# Patient Record
Sex: FEMALE | Race: WHITE | NOT HISPANIC OR LATINO | Employment: FULL TIME | URBAN - METROPOLITAN AREA
[De-identification: names, ages, dates, MRNs, and addresses within clinical notes are randomized per-mention and may not be internally consistent; named-entity substitution may affect disease eponyms.]

---

## 2017-01-16 ENCOUNTER — TRANSFERRED RECORDS (OUTPATIENT)
Dept: HEALTH INFORMATION MANAGEMENT | Facility: CLINIC | Age: 57
End: 2017-01-16

## 2017-01-18 ENCOUNTER — TRANSFERRED RECORDS (OUTPATIENT)
Dept: HEALTH INFORMATION MANAGEMENT | Facility: CLINIC | Age: 57
End: 2017-01-18

## 2017-02-15 ENCOUNTER — TRANSFERRED RECORDS (OUTPATIENT)
Dept: HEALTH INFORMATION MANAGEMENT | Facility: CLINIC | Age: 57
End: 2017-02-15

## 2017-03-13 ENCOUNTER — TRANSFERRED RECORDS (OUTPATIENT)
Dept: HEALTH INFORMATION MANAGEMENT | Facility: CLINIC | Age: 57
End: 2017-03-13

## 2017-03-23 ENCOUNTER — TRANSFERRED RECORDS (OUTPATIENT)
Dept: HEALTH INFORMATION MANAGEMENT | Facility: CLINIC | Age: 57
End: 2017-03-23

## 2017-03-29 ENCOUNTER — TRANSFERRED RECORDS (OUTPATIENT)
Dept: HEALTH INFORMATION MANAGEMENT | Facility: CLINIC | Age: 57
End: 2017-03-29

## 2017-05-10 ENCOUNTER — TRANSFERRED RECORDS (OUTPATIENT)
Dept: HEALTH INFORMATION MANAGEMENT | Facility: CLINIC | Age: 57
End: 2017-05-10

## 2017-06-28 ENCOUNTER — TRANSFERRED RECORDS (OUTPATIENT)
Dept: HEALTH INFORMATION MANAGEMENT | Facility: CLINIC | Age: 57
End: 2017-06-28

## 2017-07-27 ENCOUNTER — TRANSFERRED RECORDS (OUTPATIENT)
Dept: HEALTH INFORMATION MANAGEMENT | Facility: CLINIC | Age: 57
End: 2017-07-27

## 2017-09-28 ENCOUNTER — TRANSFERRED RECORDS (OUTPATIENT)
Dept: HEALTH INFORMATION MANAGEMENT | Facility: CLINIC | Age: 57
End: 2017-09-28

## 2017-11-01 ENCOUNTER — TRANSFERRED RECORDS (OUTPATIENT)
Dept: HEALTH INFORMATION MANAGEMENT | Facility: CLINIC | Age: 57
End: 2017-11-01

## 2017-11-02 NOTE — PROGRESS NOTES
SUBJECTIVE:   CC: Natty Roa is an 57 year old woman who presents for preventive health visit.     Healthy Habits:    Do you get at least three servings of calcium containing foods daily (dairy, green leafy vegetables, etc.)? yes    Amount of exercise or daily activities, outside of work: 0 day(s) per week    Problems taking medications regularly No    Medication side effects: No    Have you had an eye exam in the past two years? Yes, and has an appt scheduled in 2 weeks    Do you see a dentist twice per year? no    Do you have sleep apnea, excessive snoring or daytime drowsiness?yes- daytime drowsiness,  says she snores    The 10-year ASCVD risk score (Gabrielazulma BRAR Jr, et al., 2013) is: 1.5%    Values used to calculate the score:      Age: 57 years      Sex: Female      Is Non- : No      Diabetic: No      Tobacco smoker: No      Systolic Blood Pressure: 124 mmHg      Is BP treated: No      HDL Cholesterol: 68 mg/dL      Total Cholesterol: 198 mg/dL      Had mammogram done on Wednesday. Records are being sent    Abnormal Mood Symptoms  Onset: a few years    Description:   Depression: YES  Anxiety: YES    Accompanying Signs & Symptoms:  Still participating in activities that you used to enjoy: YES  Fatigue: YES  Irritability: YES  Difficulty concentrating: sometimes  Changes in appetite: no   Problems with sleep: YES- since sacha-menopause  Heart racing/beating fast : YES  Thoughts of hurting yourself or others: none    History:   Recent stress: YES  Prior depression hospitalization: None  Family history of depression: maybe  Family history of anxiety: no     Precipitating factors:   Alcohol/drug use: no    Alleviating factors:  counseling    Therapies Tried and outcome: None    Had a really rough year last year, see Dr. Shaw's last note.   Started seeing a counselor and this is going well. They are doing individual and couples therapy  Feels benavides, more anger and anxiety  Has never  been on depression medication medications before        Today's PHQ-2 Score:   PHQ-2 ( 1999 Pf[employer]) 11/30/2016 10/14/2016   Q1: Little interest or pleasure in doing things 0 2   Q2: Feeling down, depressed or hopeless 0 -   PHQ-2 Score 0 -   Q1: Little interest or pleasure in doing things - -   Q2: Feeling down, depressed or hopeless - -   PHQ-2 Score - -         Abuse: Current or Past(Physical, Sexual or Emotional)- No  Do you feel safe in your environment - Yes  Social History   Substance Use Topics     Smoking status: Former Smoker     Packs/day: 0.20     Years: 3.00     Types: Cigarettes     Quit date: 4/28/1990     Smokeless tobacco: Never Used     Alcohol use Yes      Comment: 1-2/mo     The patient does not drink >3 drinks per day nor >7 drinks per week.    Reviewed orders with patient.  Reviewed health maintenance and updated orders accordingly - Yes  Labs reviewed in EPIC  BP Readings from Last 3 Encounters:   11/03/17 124/78   11/30/16 126/80   10/14/16 132/86    Wt Readings from Last 3 Encounters:   11/03/17 158 lb 11.2 oz (72 kg)   11/30/16 154 lb 11.2 oz (70.2 kg)   10/14/16 152 lb 11.2 oz (69.3 kg)                      Patient over age 50, mutual decision to screen reflected in health maintenance.      Pertinent mammograms are reviewed under the imaging tab.  History of abnormal Pap smear: Status post benign hysterectomy. Health Maintenance and Surgical History updated.    Reviewed and updated as needed this visit by clinical staff  Tobacco  Allergies  Meds         Reviewed and updated as needed this visit by Provider            ROS:  C: NEGATIVE for fever, chills, change in weight  I: NEGATIVE for worrisome rashes, moles or lesions  E: NEGATIVE for vision changes or irritation  ENT: NEGATIVE for ear, mouth and throat problems  R: NEGATIVE for significant cough or SOB  B: NEGATIVE for masses, tenderness or discharge  CV: NEGATIVE for chest pain, palpitations or peripheral edema  GI: NEGATIVE for  nausea, abdominal pain, heartburn, or change in bowel habits  : NEGATIVE for unusual urinary or vaginal symptoms. No vaginal bleeding.  M: NEGATIVE for significant arthralgias or myalgia  N: NEGATIVE for weakness, dizziness or paresthesias  E: NEGATIVE for temperature intolerance, skin/hair changes  H: NEGATIVE for bleeding problems  PSYCHIATRIC: NEGATIVE foralcohol abuse, delusions, hallucinations, drug usage, thoughts of hurting someone else and thoughts of self harm     OBJECTIVE:   /78  Pulse 77  Temp 97.3  F (36.3  C) (Oral)  Wt 158 lb 11.2 oz (72 kg)  LMP 04/08/2011  SpO2 98%  BMI 26.41 kg/m2  EXAM:  GENERAL: healthy, alert and no distress  EYES: Eyes grossly normal to inspection, PERRL and conjunctivae and sclerae normal  HENT: ear canals and TM's normal, nose and mouth without ulcers or lesions  NECK: no adenopathy, no asymmetry, masses, or scars and thyroid normal to palpation  RESP: lungs clear to auscultation - no rales, rhonchi or wheezes  BREAST: normal without masses, tenderness or nipple discharge and no palpable axillary masses or adenopathy  CV: regular rate and rhythm, normal S1 S2, no S3 or S4, no murmur, click or rub, no peripheral edema and peripheral pulses strong  ABDOMEN: soft, nontender, no hepatosplenomegaly, no masses and bowel sounds normal  MS: no gross musculoskeletal defects noted, no edema  SKIN: no suspicious lesions or rashes  NEURO: Normal strength and tone, mentation intact and speech normal  PSYCH: mentation appears normal, affect normal/bright    ASSESSMENT/PLAN:       ICD-10-CM    1. Encounter for routine adult medical exam with abnormal findings Z00.01    2. LATHA (generalized anxiety disorder) F41.1 sertraline (ZOLOFT) 50 MG tablet     OFFICE/OUTPT VISIT,EST,LEVL III   3. Major depressive disorder, recurrent episode, moderate (H) F33.1 sertraline (ZOLOFT) 50 MG tablet     OFFICE/OUTPT VISIT,EST,LEVL III   4. Vitamin D deficiency E55.9 Vitamin D Deficiency   5.  "Screen for colon cancer Z12.11 COLORECTAL SURGERY REFERRAL   6. Visit for screening mammogram Z12.31 MA SCREENING DIGITAL BILAT - Future  (s+30)   7. Need for hepatitis C screening test Z11.59 Hepatitis C Screen Reflex to HCV RNA Quant and Genotype   8. Abnormal laboratory test R89.9 GGT     Comprehensive metabolic panel (BMP + Alb, Alk Phos, ALT, AST, Total. Bili, TP)   9. History of anemia Z86.2 CBC with platelets     Start zoloft as directed. See patient instructions. Return to clinic for any new or worsening symptoms or go to ER Urgent care in off hours    > 20 minutes were spent with the patient and / or family present in education and / or counseling regarding the above issues in addition to the preventative physical.  This represented more than 50% of the time spent interacting with the patient during this visit.     COUNSELING:   Reviewed preventive health counseling, as reflected in patient instructions         reports that she quit smoking about 27 years ago. Her smoking use included Cigarettes. She has a 0.60 pack-year smoking history. She has never used smokeless tobacco.    Estimated body mass index is 26.41 kg/(m^2) as calculated from the following:    Height as of 11/30/16: 5' 5\" (1.651 m).    Weight as of this encounter: 158 lb 11.2 oz (72 kg).       Counseling Resources:  ATP IV Guidelines  Pooled Cohorts Equation Calculator  Breast Cancer Risk Calculator  FRAX Risk Assessment  ICSI Preventive Guidelines  Dietary Guidelines for Americans, 2010  USDA's MyPlate  ASA Prophylaxis  Lung CA Screening    Clara Welch PA-C  [facility]  "

## 2017-11-02 NOTE — PATIENT INSTRUCTIONS
Start zoloft 1/2 tab daily for 1 week, then increase to 1 tab daily  Follow up in about 6 weeks  Schedule colonoscopy    Preventive Health Recommendations  Female Ages 50 - 64    Yearly exam: See your health care provider every year in order to  o Review health changes.   o Discuss preventive care.    o Review your medicines if your doctor has prescribed any.      Get a Pap test every three years (unless you have an abnormal result and your provider advises testing more often).    If you get Pap tests with HPV test, you only need to test every 5 years, unless you have an abnormal result.     You do not need a Pap test if your uterus was removed (hysterectomy) and you have not had cancer.    You should be tested each year for STDs (sexually transmitted diseases) if you're at risk.     Have a mammogram every 1 to 2 years.    Have a colonoscopy at age 50, or have a yearly FIT test (stool test). These exams screen for colon cancer.      Have a cholesterol test every 5 years, or more often if advised.    Have a diabetes test (fasting glucose) every three years. If you are at risk for diabetes, you should have this test more often.     If you are at risk for osteoporosis (brittle bone disease), think about having a bone density scan (DEXA).    Shots: Get a flu shot each year. Get a tetanus shot every 10 years.    Nutrition:     Eat at least 5 servings of fruits and vegetables each day.    Eat whole-grain bread, whole-wheat pasta and brown rice instead of white grains and rice.    Talk to your provider about Calcium and Vitamin D.     Lifestyle    Exercise at least 150 minutes a week (30 minutes a day, 5 days a week). This will help you control your weight and prevent disease.    Limit alcohol to one drink per day.    No smoking.     Wear sunscreen to prevent skin cancer.     See your dentist every six months for an exam and cleaning.    See your eye doctor every 1 to 2 years.

## 2017-11-03 ENCOUNTER — OFFICE VISIT (OUTPATIENT)
Dept: FAMILY MEDICINE | Facility: CLINIC | Age: 57
End: 2017-11-03
Payer: COMMERCIAL

## 2017-11-03 VITALS
DIASTOLIC BLOOD PRESSURE: 78 MMHG | OXYGEN SATURATION: 98 % | HEART RATE: 77 BPM | TEMPERATURE: 97.3 F | BODY MASS INDEX: 26.41 KG/M2 | SYSTOLIC BLOOD PRESSURE: 124 MMHG | WEIGHT: 158.7 LBS

## 2017-11-03 DIAGNOSIS — F41.1 GAD (GENERALIZED ANXIETY DISORDER): ICD-10-CM

## 2017-11-03 DIAGNOSIS — Z00.01 ENCOUNTER FOR ROUTINE ADULT MEDICAL EXAM WITH ABNORMAL FINDINGS: Primary | ICD-10-CM

## 2017-11-03 DIAGNOSIS — Z12.11 SCREEN FOR COLON CANCER: ICD-10-CM

## 2017-11-03 DIAGNOSIS — Z11.59 NEED FOR HEPATITIS C SCREENING TEST: ICD-10-CM

## 2017-11-03 DIAGNOSIS — Z12.31 VISIT FOR SCREENING MAMMOGRAM: ICD-10-CM

## 2017-11-03 DIAGNOSIS — R89.9 ABNORMAL LABORATORY TEST: ICD-10-CM

## 2017-11-03 DIAGNOSIS — Z86.2 HISTORY OF ANEMIA: ICD-10-CM

## 2017-11-03 DIAGNOSIS — E55.9 VITAMIN D DEFICIENCY: ICD-10-CM

## 2017-11-03 DIAGNOSIS — F33.1 MAJOR DEPRESSIVE DISORDER, RECURRENT EPISODE, MODERATE (H): ICD-10-CM

## 2017-11-03 LAB
ERYTHROCYTE [DISTWIDTH] IN BLOOD BY AUTOMATED COUNT: 13 % (ref 10–15)
HCT VFR BLD AUTO: 40.4 % (ref 35–47)
HGB BLD-MCNC: 13.7 G/DL (ref 11.7–15.7)
MCH RBC QN AUTO: 30.9 PG (ref 26.5–33)
MCHC RBC AUTO-ENTMCNC: 33.9 G/DL (ref 31.5–36.5)
MCV RBC AUTO: 91 FL (ref 78–100)
PLATELET # BLD AUTO: 263 10E9/L (ref 150–450)
RBC # BLD AUTO: 4.44 10E12/L (ref 3.8–5.2)
WBC # BLD AUTO: 5.8 10E9/L (ref 4–11)

## 2017-11-03 PROCEDURE — 82977 ASSAY OF GGT: CPT | Performed by: FAMILY MEDICINE

## 2017-11-03 PROCEDURE — 80053 COMPREHEN METABOLIC PANEL: CPT | Performed by: FAMILY MEDICINE

## 2017-11-03 PROCEDURE — 86803 HEPATITIS C AB TEST: CPT | Performed by: FAMILY MEDICINE

## 2017-11-03 PROCEDURE — 82306 VITAMIN D 25 HYDROXY: CPT | Performed by: FAMILY MEDICINE

## 2017-11-03 PROCEDURE — 36415 COLL VENOUS BLD VENIPUNCTURE: CPT | Performed by: FAMILY MEDICINE

## 2017-11-03 PROCEDURE — 99396 PREV VISIT EST AGE 40-64: CPT | Performed by: PHYSICIAN ASSISTANT

## 2017-11-03 PROCEDURE — 99213 OFFICE O/P EST LOW 20 MIN: CPT | Mod: 25 | Performed by: PHYSICIAN ASSISTANT

## 2017-11-03 PROCEDURE — 85027 COMPLETE CBC AUTOMATED: CPT | Performed by: FAMILY MEDICINE

## 2017-11-03 NOTE — MR AVS SNAPSHOT
After Visit Summary   11/3/2017    Natty Roa    MRN: [id number]           Patient Information     Date Of Birth          1960        Visit Information        Provider Department      11/3/2017 9:00 AM Clara Welch PA-C [facility] [place]        Today's Diagnoses     Encounter for routine adult medical exam with abnormal findings    -  1    Screen for colon cancer        Visit for screening mammogram        Need for hepatitis C screening test        Abnormal laboratory test        LATHA (generalized anxiety disorder)        Major depressive disorder, recurrent episode, moderate (H)        History of anemia        Vitamin D deficiency          Care Instructions    Start zoloft 1/2 tab daily for 1 week, then increase to 1 tab daily  Follow up in about 6 weeks  Schedule colonoscopy    Preventive Health Recommendations  Female Ages 50 - 64    Yearly exam: See your health care provider every year in order to  o Review health changes.   o Discuss preventive care.    o Review your medicines if your doctor has prescribed any.      Get a Pap test every three years (unless you have an abnormal result and your provider advises testing more often).    If you get Pap tests with HPV test, you only need to test every 5 years, unless you have an abnormal result.     You do not need a Pap test if your uterus was removed (hysterectomy) and you have not had cancer.    You should be tested each year for STDs (sexually transmitted diseases) if you're at risk.     Have a mammogram every 1 to 2 years.    Have a colonoscopy at age 50, or have a yearly FIT test (stool test). These exams screen for colon cancer.      Have a cholesterol test every 5 years, or more often if advised.    Have a diabetes test (fasting glucose) every three years. If you are at risk for diabetes, you should have this test more often.     If you are at risk for osteoporosis (brittle bone disease), think about having a  bone density scan (DEXA).    Shots: Get a flu shot each year. Get a tetanus shot every 10 years.    Nutrition:     Eat at least 5 servings of fruits and vegetables each day.    Eat whole-grain bread, whole-wheat pasta and brown rice instead of white grains and rice.    Talk to your provider about Calcium and Vitamin D.     Lifestyle    Exercise at least 150 minutes a week (30 minutes a day, 5 days a week). This will help you control your weight and prevent disease.    Limit alcohol to one drink per day.    No smoking.     Wear sunscreen to prevent skin cancer.     See your dentist every six months for an exam and cleaning.    See your eye doctor every 1 to 2 years.            Follow-ups after your visit        Additional Services     COLORECTAL SURGERY REFERRAL       Your provider has referred you to: [facility]: [facility] [phone]   [email or web address]    Referral Reason(s): Colonoscopy  Special Concerns: None  This referral is: Elective (week +)  It is OK to leave a message on patient's voicemail.    Please be aware that coverage of these services is subject to the terms and limitations of your health insurance plan.  Call member services at your health plan with any benefit or coverage questions.      Please bring the following with you to your appointment:    (1) Any X-Rays, CTs or MRIs which have been performed.  Contact the facility where they were done to arrange for  prior to your scheduled appointment.    (2) List of current medications  (3) This referral request   (4) Any documents/labs given to you for this referral                  Future tests that were ordered for you today     Open Future Orders        Priority Expected Expires Ordered    MA SCREENING DIGITAL BILAT - Future  (s+30) Routine  11/2/2018 11/3/2017            Who to contact     If you have questions or need follow up information about today's clinic visit or your  schedule please contact [facility] directly at [phone].  Normal or non-critical lab and imaging results will be communicated to you by MyChart, letter or phone within 4 business days after the clinic has received the results. If you do not hear from us within 7 days, please contact the clinic through [employer] or phone. If you have a critical or abnormal lab result, we will notify you by phone as soon as possible.  Submit refill requests through [employer] or call your pharmacy and they will forward the refill request to us. Please allow 3 business days for your refill to be completed.          Additional Information About Your Visit        [employer] Information     [employer] gives you secure access to your electronic health record. If you see a primary care provider, you can also send messages to your care team and make appointments. If you have questions, please call your primary care clinic.  If you do not have a primary care provider, please call [phone] and they will assist you.        Care EveryWhere ID     This is your Care EveryWhere ID. This could be used by other organizations to access your [facility] medical records  [id number]        Your Vitals Were     Pulse Temperature Last Period Pulse Oximetry BMI (Body Mass Index)       77 97.3  F (36.3  C) (Oral) 04/08/2011 98% 26.41 kg/m2        Blood Pressure from Last 3 Encounters:   11/03/17 124/78   11/30/16 126/80   10/14/16 132/86    Weight from Last 3 Encounters:   11/03/17 158 lb 11.2 oz (72 kg)   11/30/16 154 lb 11.2 oz (70.2 kg)   10/14/16 152 lb 11.2 oz (69.3 kg)              We Performed the Following     CBC with platelets     COLORECTAL SURGERY REFERRAL     Comprehensive metabolic panel (BMP + Alb, Alk Phos, ALT, AST, Total. Bili, TP)     GGT     Hepatitis C Screen Reflex to HCV RNA Quant and Genotype     Vitamin D Deficiency          Today's Medication Changes          These changes are accurate as of: 11/3/17  9:04 AM.  If you  have any questions, ask your nurse or doctor.               Start taking these medicines.        Dose/Directions    sertraline 50 MG tablet   Commonly known as:  ZOLOFT   Used for:  LATHA (generalized anxiety disorder), Major depressive disorder, recurrent episode, moderate (H)   Started by:  Clara Welch PA-C        Take 1/2 tablet (25 mg) for 1-2 weeks, then increase to 1 tablet orally daily   Quantity:  45 tablet   Refills:  0            Where to get your medicines      These medications were sent to [place] [address] [place] [address]  [address] [place] [id number]     Phone:  [phone]     sertraline 50 MG tablet                Primary Care Provider Office Phone # Fax #    Sanford Shaw [phone]       [address] [address]        Equal Access to Services     [facility]: Hadii aad ku hadasho Soomaali, waaxda luqadaha, qaybta kaalmada adeegyada, waxay emilia hayfranco villasenor . So [facility] [phone].    ATENCIÓN: Si habla español, tiene a rosa disposición servicios gratuitos de asistencia lingüística. Rafaela al [phone].    We comply with applicable federal civil rights laws and [place] laws. We do not discriminate on the basis of race, color, national origin, age, disability, sex, sexual orientation, or gender identity.            Thank you!     Thank you for [place] [facility] [place]. Our goal is always to provide you with excellent care. Hearing back from our patients is one way we can continue to improve our services. Please take a few minutes to complete the written survey that you may receive in the mail after your visit with us. Thank you!             Your Updated Medication List - Protect others around you: Learn how to safely use, store and throw away your medicines at www.disposemymeds.org.          This list is accurate as of: 11/3/17  9:04 AM.  Always  use your most recent med list.                   Brand Name Dispense Instructions for use Diagnosis    ACIDOPHILUS PO      Take  by mouth.        MULU-C Tabs      Take 1,000 mg by mouth.        ibuprofen 200 MG tablet    ADVIL/MOTRIN     Take 400 mg by mouth as needed.        priLOSEC 20 MG CR capsule   Generic drug:  omeprazole      Take 20 mg by mouth daily.        pseudoePHEDrine 30 MG tablet    SUDAFED     Take 2 tablets by mouth every 4 hours as needed.        sertraline 50 MG tablet    ZOLOFT    45 tablet    Take 1/2 tablet (25 mg) for 1-2 weeks, then increase to 1 tablet orally daily    LATHA (generalized anxiety disorder), Major depressive disorder, recurrent episode, moderate (H)       SUPER B COMPLEX PO      Take  by mouth.        vitamin D3 2000 UNITS Caps      Take  by mouth.        Zinc 50 MG Caps      Take  by mouth.

## 2017-11-04 LAB
ALBUMIN SERPL-MCNC: 4.1 G/DL (ref 3.4–5)
ALP SERPL-CCNC: 162 U/L (ref 40–150)
ALT SERPL W P-5'-P-CCNC: 34 U/L (ref 0–50)
ANION GAP SERPL CALCULATED.3IONS-SCNC: 7 MMOL/L (ref 3–14)
AST SERPL W P-5'-P-CCNC: 24 U/L (ref 0–45)
BILIRUB SERPL-MCNC: 0.5 MG/DL (ref 0.2–1.3)
BUN SERPL-MCNC: 16 MG/DL (ref 7–30)
CALCIUM SERPL-MCNC: 9.3 MG/DL (ref 8.5–10.1)
CHLORIDE SERPL-SCNC: 105 MMOL/L (ref 94–109)
CO2 SERPL-SCNC: 27 MMOL/L (ref 20–32)
CREAT SERPL-MCNC: 0.73 MG/DL (ref 0.52–1.04)
GFR SERPL CREATININE-BSD FRML MDRD: 83 ML/MIN/1.7M2
GGT SERPL-CCNC: 11 U/L (ref 0–40)
GLUCOSE SERPL-MCNC: 86 MG/DL (ref 70–99)
POTASSIUM SERPL-SCNC: 4.1 MMOL/L (ref 3.4–5.3)
PROT SERPL-MCNC: 7.8 G/DL (ref 6.8–8.8)
SODIUM SERPL-SCNC: 139 MMOL/L (ref 133–144)

## 2017-11-06 LAB
DEPRECATED CALCIDIOL+CALCIFEROL SERPL-MC: 73 UG/L (ref 20–75)
HCV AB SERPL QL IA: NONREACTIVE

## 2017-11-30 PROBLEM — R74.8 ELEVATED ALKALINE PHOSPHATASE MEASUREMENT: Status: ACTIVE | Noted: 2017-11-30

## 2017-12-22 ENCOUNTER — OFFICE VISIT (OUTPATIENT)
Dept: FAMILY MEDICINE | Facility: CLINIC | Age: 57
End: 2017-12-22
Payer: COMMERCIAL

## 2017-12-22 VITALS
DIASTOLIC BLOOD PRESSURE: 80 MMHG | WEIGHT: 158.4 LBS | HEART RATE: 76 BPM | OXYGEN SATURATION: 96 % | BODY MASS INDEX: 26.39 KG/M2 | HEIGHT: 65 IN | SYSTOLIC BLOOD PRESSURE: 124 MMHG | RESPIRATION RATE: 20 BRPM | TEMPERATURE: 97.4 F

## 2017-12-22 DIAGNOSIS — F33.1 MAJOR DEPRESSIVE DISORDER, RECURRENT EPISODE, MODERATE (H): Primary | ICD-10-CM

## 2017-12-22 DIAGNOSIS — F41.1 GAD (GENERALIZED ANXIETY DISORDER): ICD-10-CM

## 2017-12-22 PROCEDURE — 99214 OFFICE O/P EST MOD 30 MIN: CPT | Performed by: FAMILY MEDICINE

## 2017-12-22 ASSESSMENT — PATIENT HEALTH QUESTIONNAIRE - PHQ9
5. POOR APPETITE OR OVEREATING: SEVERAL DAYS
SUM OF ALL RESPONSES TO PHQ QUESTIONS 1-9: 4

## 2017-12-22 ASSESSMENT — ANXIETY QUESTIONNAIRES
GAD7 TOTAL SCORE: 2
6. BECOMING EASILY ANNOYED OR IRRITABLE: NOT AT ALL
7. FEELING AFRAID AS IF SOMETHING AWFUL MIGHT HAPPEN: NOT AT ALL
3. WORRYING TOO MUCH ABOUT DIFFERENT THINGS: SEVERAL DAYS
1. FEELING NERVOUS, ANXIOUS, OR ON EDGE: NOT AT ALL
5. BEING SO RESTLESS THAT IT IS HARD TO SIT STILL: NOT AT ALL
2. NOT BEING ABLE TO STOP OR CONTROL WORRYING: NOT AT ALL

## 2017-12-22 NOTE — MR AVS SNAPSHOT
"              After Visit Summary   12/22/2017    Natty Roa    MRN: [id number]           Patient Information     Date Of Birth          1960        Visit Information        Provider Department      12/22/2017 9:45 AM Sanford Shaw MD [facility] [place]        Today's Diagnoses     Major depressive disorder, recurrent episode, moderate (H)    -  1    LATHA (generalized anxiety disorder)          Care Instructions    -Please return for your pre-op physical.          Follow-ups after your visit        Who to contact     If you have questions or need follow up information about today's clinic visit or your schedule please contact [facility] directly at [phone].  Normal or non-critical lab and imaging results will be communicated to you by MyChart, letter or phone within 4 business days after the clinic has received the results. If you do not hear from us within 7 days, please contact the clinic through We[employer] or phone. If you have a critical or abnormal lab result, we will notify you by phone as soon as possible.  Submit refill requests through [employer] or call your pharmacy and they will forward the refill request to us. Please allow 3 business days for your refill to be completed.          Additional Information About Your Visit        MyChart Information     [employer] gives you secure access to your electronic health record. If you see a primary care provider, you can also send messages to your care team and make appointments. If you have questions, please call your primary care clinic.  If you do not have a primary care provider, please call [phone] and they will assist you.        Care EveryWhere ID     This is your Care EveryWhere ID. This could be used by other organizations to access your [facility] medical records  [id number]        Your Vitals Were     Pulse Temperature Respirations Height Last Period Pulse Oximetry    76 97.4  F (36.3  C) (Oral) 20 5' 5\" (1.651 " m) 04/08/2011 96%    BMI (Body Mass Index)                   26.36 kg/m2            Blood Pressure from Last 3 Encounters:   12/22/17 124/80   11/03/17 124/78   11/30/16 126/80    Weight from Last 3 Encounters:   12/22/17 158 lb 6.4 oz (71.8 kg)   11/03/17 158 lb 11.2 oz (72 kg)   11/30/16 154 lb 11.2 oz (70.2 kg)              We Performed the Following     DEPRESSION ACTION PLAN (DAP)          Today's Medication Changes          These changes are accurate as of: 12/22/17 11:59 PM.  If you have any questions, ask your nurse or doctor.               These medicines have changed or have updated prescriptions.        Dose/Directions    sertraline 50 MG tablet   Commonly known as:  ZOLOFT   This may have changed:    - how much to take  - how to take this  - when to take this  - additional instructions   Used for:  LATHA (generalized anxiety disorder), Major depressive disorder, recurrent episode, moderate (H)   Changed by:  Sanford Shaw MD        Dose:  50 mg   Take 1 tablet (50 mg) by mouth daily   Quantity:  90 tablet   Refills:  3            Where to get your medicines      These medications were sent to [place] [address] [place] [address]  [address] [place] [id number]     Phone:  [phone]     sertraline 50 MG tablet                Primary Care Provider Office Phone # Fax #    Sanford Shaw [phone]       [address] [address]        Equal Access to Services     [facility]: Hadii taya ku hadasho Soomaali, waaxda luqadaha, qaybta kaalmada adeegyada, darrel ramos. So [facility] [phone].    ATENCIÓN: Si habla español, tiene a rosa disposición servicios gratuitos de asistencia lingüística. Llame al [phone].    We comply with applicable federal civil rights laws and [place] laws. We do not discriminate on the basis of race, color, national origin, age, disability, sex, sexual  orientation, or gender identity.            Thank you!     Thank you for choosing [facility] [place]  for your care. Our goal is always to provide you with excellent care. Hearing back from our patients is one way we can continue to improve our services. Please take a few minutes to complete the written survey that you may receive in the mail after your visit with us. Thank you!             Your Updated Medication List - Protect others around you: Learn how to safely use, store and throw away your medicines at www.disposemymeds.org.          This list is accurate as of: 12/22/17 11:59 PM.  Always use your most recent med list.                   Brand Name Dispense Instructions for use Diagnosis    ACIDOPHILUS PO      Take  by mouth.        MULU-C Tabs      Take 1,000 mg by mouth.        ibuprofen 200 MG tablet    ADVIL/MOTRIN     Take 400 mg by mouth as needed.        priLOSEC 20 MG CR capsule   Generic drug:  omeprazole      Take 20 mg by mouth daily.        pseudoePHEDrine 30 MG tablet    SUDAFED     Take 2 tablets by mouth every 4 hours as needed.        sertraline 50 MG tablet    ZOLOFT    90 tablet    Take 1 tablet (50 mg) by mouth daily    LATHA (generalized anxiety disorder), Major depressive disorder, recurrent episode, moderate (H)       SUPER B COMPLEX PO      Take  by mouth.        vitamin D3 2000 UNITS Caps      Take  by mouth.        Zinc 50 MG Caps      Take  by mouth.

## 2017-12-22 NOTE — PROGRESS NOTES
SUBJECTIVE:   Natty Roa is a 57 year old female who presents to clinic today for the following health issues:    Depression and Anxiety Follow-Up    Status since last visit: Improved     Other associated symptoms:None    Complicating factors:     Significant life event: No     Current substance abuse: None    PHQ-9 Score and MyChart F/U Questions 10/14/2016   Total Score 18   Q9: Suicide Ideation Not at all     No flowsheet data found.  PHQ-9  English  PHQ-9   Any Language  GAD7  Suicide Assessment Five-step Evaluation and Treatment (SAFE-T)      Amount of exercise or physical activity: None    Problems taking medications regularly: No    Medication side effects: none    Diet: regular (no restrictions)    Patient says that her mood has been better since she was last seen, and she is doing well on her sertraline. She is expecting her first grandchildren, as her daughter-in-law is pregnant with twins, and she is very excited for grandchildren. She has been on the sertraline for 6 weeks, and she says she and her  think she is better on the medication, and she feels as though her mood is under better control. Patient has been seeing a counselor, who also believes that she is calmer on the medication. No problems with worsened insomnia and fatigue on the medication, and she says that she sleeps better on the medication since she started taking it at night.    Problem list and histories reviewed & adjusted, as indicated.  Additional history: as documented    Patient Active Problem List   Diagnosis     GERD (gastroesophageal reflux disease)     CARDIOVASCULAR SCREENING; LDL GOAL LESS THAN 160     Allergic rhinitis     Bilateral hip pain     Congestion of paranasal sinus     Palpitations     Anemia due to blood loss, acute     Major depressive disorder, recurrent episode, moderate (H)     LATHA (generalized anxiety disorder)     Elevated alkaline phosphatase measurement     Closed fracture of left femur (H)      Past Surgical History:   Procedure Laterality Date     BREAST BIOPSY, RT/LT      Breat Biopsy RT     C/SECTION, LOW TRANSVERSE      , Low Transverse     cervical cautery       CRYOTHERAPY, CERVICAL       HYSTERECTOMY, PAP NO LONGER INDICATED       HYSTEROSCOPY         Social History   Substance Use Topics     Smoking status: Former Smoker     Packs/day: 0.20     Years: 3.00     Types: Cigarettes     Quit date: 1990     Smokeless tobacco: Never Used     Alcohol use Yes      Comment: 1-2/mo     Family History   Problem Relation Age of Onset     Hypertension Mother      GASTROINTESTINAL DISEASE Mother      Alzheimer Disease Maternal Grandmother      CEREBROVASCULAR DISEASE Maternal Grandfather      Cancer - colorectal Maternal Grandfather      Breast Cancer Paternal Grandmother      CEREBROVASCULAR DISEASE Paternal Grandmother      CANCER Paternal Grandfather      GASTROINTESTINAL DISEASE Brother          Current Outpatient Prescriptions   Medication Sig Dispense Refill     sertraline (ZOLOFT) 50 MG tablet Take 1/2 tablet (25 mg) for 1-2 weeks, then increase to 1 tablet orally daily 45 tablet 0     pseudoePHEDrine (SUDAFED) 30 MG tablet Take 2 tablets by mouth every 4 hours as needed.       ibuprofen (ADVIL,MOTRIN) 200 MG tablet Take 400 mg by mouth as needed.       Bioflavonoid Products (MULU-C) TABS Take 1,000 mg by mouth.        B Complex-C (SUPER B COMPLEX PO) Take  by mouth.       Lactobacillus (ACIDOPHILUS PO) Take  by mouth.       omeprazole (PRILOSEC) 20 MG capsule Take 20 mg by mouth daily.       Cholecalciferol (VITAMIN D3) 2000 UNIT CAPS Take  by mouth.       Zinc 50 MG CAPS Take  by mouth.       Allergies   Allergen Reactions     Cat Hair Extract      Chlor-Trimeton      Demerol      Morphine Nausea and Vomiting     Amoxicillin Rash         Reviewed and updated as needed this visit by clinical staffTobacco  Allergies  Meds  Med Hx  Surg Hx  Fam Hx  Soc Hx      Reviewed and updated  "as needed this visit by Provider         ROS:  Denies headache, insomnia, chest pain, shortness of breath, cough, heartburn, bowel issues, bladder issues, neck pain, back pain, hip pain, knee pain, ankle pain, or foot pain. Remainder of ROS is negative unless otherwise noted above or in HPI.    This document serves as a record of the services and decisions personally performed and made by Sanford Shaw MD. It was created on his behalf by Dangelo Russo, a trained medical scribe. The creation of this document is based the provider's statements to the medical scribe.  Dangelo Russo 9:55 AM December 22, 2017    OBJECTIVE:     /80  Pulse 76  Temp 97.4  F (36.3  C) (Oral)  Resp 20  Ht 1.651 m (5' 5\")  Wt 71.8 kg (158 lb 6.4 oz)  LMP 04/08/2011  SpO2 96%  BMI 26.36 kg/m2  Body mass index is 26.36 kg/(m^2).  GENERAL: healthy, alert and no distress  RESP: lungs clear to auscultation - no rales, rhonchi or wheezes  CV: regular rate and rhythm, normal S1 S2, no S3 or S4, no murmur, click or rub, no peripheral edema and peripheral pulses strong  NEURO: Normal strength and tone, mentation intact and speech normal  PSYCH: mentation appears normal, affect normal/bright    Diagnostic Test Results:  No results found for this or any previous visit (from the past 24 hour(s)).    ASSESSMENT/PLAN:     (F33.1) Major depressive disorder, recurrent episode, moderate (H)  (primary encounter diagnosis)  Comment: Stable and improved since last visit. Controlled on sertraline.  Plan: sertraline (ZOLOFT) 50 MG tablet        Continue on current medication. Follow up as needed.    (F41.1) LATHA (generalized anxiety disorder)  Comment: Stable and improved since last visit. Controlled on sertraline.  Plan: sertraline (ZOLOFT) 50 MG tablet        Continue on current medication. Follow up as needed.    Patient Instructions   -Please return for your pre-op physical.    The information in this document, created by the medical " scribe for me, accurately reflects the services I personally performed and the decisions made by me. I have reviewed and approved this document for accuracy prior to leaving the patient care area.   Sanford Shaw MD 9:55 AM December 22, 2017  [facility] [place]

## 2017-12-22 NOTE — LETTER
My Depression Action Plan  Name: Natty Roa   Date of Birth 1960  Date: 12/22/2017    My doctor: Sanford Shaw   My clinic: [address] [address] [phone]          GREEN    ZONE   Good Control    What it looks like:     Things are going generally well. You have normal up s and down s. You may even feel depressed from time to time, but bad moods usually last less than a day.   What you need to do:  1. Continue to care for yourself (see self care plan)  2. Check your depression survival kit and update it as needed  3. Follow your physician s recommendations including any medication.  4. Do not stop taking medication unless you consult with your physician first.           YELLOW         ZONE Getting Worse    What it looks like:     Depression is starting to interfere with your life.     It may be hard to get out of bed; you may be starting to isolate yourself from others.    Symptoms of depression are starting to last most all day and this has happened for several days.     You may have suicidal thoughts but they are not constant.   What you need to do:     1. Call your care team, your response to treatment will improve if you keep your care team informed of your progress. Yellow periods are signs an adjustment may need to be made.     2. Continue your self-care, even if you have to fake it!    3. Talk to someone in your support network    4. Open up your depression survival kit           RED    ZONE Medical Alert - Get Help    What it looks like:     Depression is seriously interfering with your life.     You may experience these or other symptoms: You can t get out of bed most days, can t work or engage in other necessary activities, you have trouble taking care of basic hygiene, or basic responsibilities, thoughts of suicide or death that will not go away, self-injurious behavior.     What you need to do:  1. Call  your care team and request a same-day appointment. If they are not available (weekends or after hours) call your local crisis line, emergency room or 911.      Electronically signed by: Sanford Shaw, December 22, 2017    Depression Self Care Plan / Survival Kit    Self-Care for Depression  Here s the deal. Your body and mind are really not as separate as most people think.  What you do and think affects how you feel and how you feel influences what you do and think. This means if you do things that people who feel good do, it will help you feel better.  Sometimes this is all it takes.  There is also a place for medication and therapy depending on how severe your depression is, so be sure to consult with your medical provider and/ or Behavioral Health Consultant if your symptoms are worsening or not improving.     In order to better manage my stress, I will:    Exercise  Get some form of exercise, every day. This will help reduce pain and release endorphins, the  feel good  chemicals in your brain. This is almost as good as taking antidepressants!  This is not the same as joining a gym and then never going! (they count on that by the way ) It can be as simple as just going for a walk or doing some gardening, anything that will get you moving.      Hygiene   Maintain good hygiene (Get out of bed in the morning, Make your bed, Brush your teeth, Take a shower, and Get dressed like you were going to work, even if you are unemployed).  If your clothes don't fit try to get ones that do.    Diet  I will strive to eat foods that are good for me, drink plenty of water, and avoid excessive sugar, caffeine, alcohol, and other mood-altering substances.  Some foods that are helpful in depression are: complex carbohydrates, B vitamins, flaxseed, fish or fish oil, fresh fruits and vegetables.    Psychotherapy  I agree to participate in Individual Therapy (if recommended).    Medication  If prescribed medications, I agree to  take them.  Missing doses can result in serious side effects.  I understand that drinking alcohol, or other illicit drug use, may cause potential side effects.  I will not stop my medication abruptly without first discussing it with my provider.    Staying Connected With Others  I will stay in touch with my friends, family members, and my primary care provider/team.    Use your imagination  Be creative.  We all have a creative side; it doesn t matter if it s oil painting, sand castles, or mud pies! This will also kick up the endorphins.    Witness Beauty  (AKA stop and smell the roses) Take a look outside, even in mid-winter. Notice colors, textures. Watch the squirrels and birds.     Service to others  Be of service to others.  There is always someone else in need.  By helping others we can  get out of ourselves  and remember the really important things.  This also provides opportunities for practicing all the other parts of the program.    Humor  Laugh and be silly!  Adjust your TV habits for less news and crime-drama and more comedy.    Control your stress  Try breathing deep, massage therapy, biofeedback, and meditation. Find time to relax each day.     My support system    Clinic Contact:  Phone number:    Contact 1:  Phone number:    Contact 2:  Phone number:    Rastafari/:  Phone number:    Therapist:  Phone number:    Local crisis center:    Phone number:    Other community support:  Phone number:

## 2017-12-23 ASSESSMENT — ANXIETY QUESTIONNAIRES: GAD7 TOTAL SCORE: 2

## 2017-12-27 ENCOUNTER — TELEPHONE (OUTPATIENT)
Dept: FAMILY MEDICINE | Facility: CLINIC | Age: 57
End: 2017-12-27

## 2017-12-27 NOTE — TELEPHONE ENCOUNTER
"12/27/2017      Patient Communication Preferences indicate  Do not contact  and/or communication by \"Phone\" is not preferred. Call not required per Outreach team.          Outreach ,  Milagros Marshall      "

## 2018-01-05 ENCOUNTER — OFFICE VISIT (OUTPATIENT)
Dept: FAMILY MEDICINE | Facility: CLINIC | Age: 58
End: 2018-01-05
Payer: COMMERCIAL

## 2018-01-05 VITALS
OXYGEN SATURATION: 96 % | DIASTOLIC BLOOD PRESSURE: 70 MMHG | TEMPERATURE: 98.5 F | BODY MASS INDEX: 26.54 KG/M2 | HEART RATE: 72 BPM | WEIGHT: 159.5 LBS | SYSTOLIC BLOOD PRESSURE: 122 MMHG

## 2018-01-05 DIAGNOSIS — K21.9 GASTROESOPHAGEAL REFLUX DISEASE, ESOPHAGITIS PRESENCE NOT SPECIFIED: ICD-10-CM

## 2018-01-05 DIAGNOSIS — Z01.818 PREOP GENERAL PHYSICAL EXAM: Primary | ICD-10-CM

## 2018-01-05 DIAGNOSIS — Z87.81 HISTORY OF FRACTURE OF LEFT HIP: ICD-10-CM

## 2018-01-05 PROCEDURE — 99214 OFFICE O/P EST MOD 30 MIN: CPT | Performed by: FAMILY MEDICINE

## 2018-01-05 NOTE — MR AVS SNAPSHOT
After Visit Summary   1/5/2018    Natty Roa    MRN: [id number]           Patient Information     Date Of Birth          1960        Visit Information        Provider Department      1/5/2018 3:30 PM Sanford Shaw MD [facility]        Today's Diagnoses     Preop general physical exam    -  1    History of fracture of left hip        Gastroesophageal reflux disease, esophagitis presence not specified          Care Instructions      Before Your Surgery      Call your surgeon if there is any change in your health. This includes signs of a cold or flu (such as a sore throat, runny nose, cough, rash or fever).    Do not smoke, drink alcohol or take over the counter medicine (unless your surgeon or primary care doctor tells you to) for the 24 hours before and after surgery.    If you take prescribed drugs: Follow your doctor s orders about which medicines to take and which to stop until after surgery.    Eating and drinking prior to surgery: follow the instructions from your surgeon    Take a shower or bath the night before surgery. Use the soap your surgeon gave you to gently clean your skin. If you do not have soap from your surgeon, use your regular soap. Do not shave or scrub the surgery site.  Wear clean pajamas and have clean sheets on your bed.           Follow-ups after your visit        Who to contact     If you have questions or need follow up information about today's clinic visit or your schedule please contact [facility] directly at [phone].  Normal or non-critical lab and imaging results will be communicated to you by MyChart, letter or phone within 4 business days after the clinic has received the results. If you do not hear from us within 7 days, please contact the clinic through [employer] or phone. If you have a critical or abnormal lab result, we will notify you by phone as soon as possible.  Submit refill requests through [employer] or call  your pharmacy and they will forward the refill request to us. Please allow 3 business days for your refill to be completed.          Additional Information About Your Visit        MyChart Information     [employer] gives you secure access to your electronic health record. If you see a primary care provider, you can also send messages to your care team and make appointments. If you have questions, please call your primary care clinic.  If you do not have a primary care provider, please call [phone] and they will assist you.        Care EveryWhere ID     This is your Care EveryWhere ID. This could be used by other organizations to access your [facility] medical records  [id number]        Your Vitals Were     Pulse Temperature Last Period Pulse Oximetry BMI (Body Mass Index)       72 98.5  F (36.9  C) (Oral) 04/08/2011 96% 26.54 kg/m2        Blood Pressure from Last 3 Encounters:   01/05/18 122/70   12/22/17 124/80   11/03/17 124/78    Weight from Last 3 Encounters:   01/05/18 159 lb 8 oz (72.3 kg)   12/22/17 158 lb 6.4 oz (71.8 kg)   11/03/17 158 lb 11.2 oz (72 kg)              Today, you had the following     No orders found for display       Primary Care Provider Office Phone # Fax #    Sanford Shaw [phone]       [address] [address]        Equal Access to Services     [facility]: Hadii aad ku hadasho Soomaali, waaxda luqadaha, qaybta kaalmada adetatyanayada, darrel villasenor . So [facility] [phone].    ATENCIÓN: Si habla español, tiene a rosa disposición servicios gratuitos de asistencia lingüística. Llame al [phone].    We comply with applicable federal civil rights laws and [place] laws. We do not discriminate on the basis of race, color, national origin, age, disability, sex, sexual orientation, or gender identity.            Thank you!     Thank you for [place] [facility] [place]. Our goal is always to  provide you with excellent care. Hearing back from our patients is one way we can continue to improve our services. Please take a few minutes to complete the written survey that you may receive in the mail after your visit with us. Thank you!             Your Updated Medication List - Protect others around you: Learn how to safely use, store and throw away your medicines at www.disposemymeds.org.          This list is accurate as of: 1/5/18  4:23 PM.  Always use your most recent med list.                   Brand Name Dispense Instructions for use Diagnosis    ACIDOPHILUS PO      Take  by mouth.        MULU-C Tabs      Take 1,000 mg by mouth.        ibuprofen 200 MG tablet    ADVIL/MOTRIN     Take 400 mg by mouth as needed.        priLOSEC 20 MG CR capsule   Generic drug:  omeprazole      Take 20 mg by mouth daily.        pseudoePHEDrine 30 MG tablet    SUDAFED     Take 2 tablets by mouth every 4 hours as needed.        sertraline 50 MG tablet    ZOLOFT    90 tablet    Take 1 tablet (50 mg) by mouth daily    LATHA (generalized anxiety disorder), Major depressive disorder, recurrent episode, moderate (H)       SUPER B COMPLEX PO      Take  by mouth.        vitamin D3 2000 UNITS Caps      Take  by mouth.        Zinc 50 MG Caps      Take  by mouth.

## 2018-01-05 NOTE — PROGRESS NOTES
[address] [address]  [phone]  Dept: [phone]    PRE-OP EVALUATION:  Today's date: 2018    Natty Roa (: 1960) presents for pre-operative evaluation assessment as requested by Dr. Jossie Arauz.  She requires evaluation and anesthesia risk assessment prior to undergoing surgery/procedure for treatment of hip hardware removal .  Proposed procedure: Removal of hardware in left hip    Date of Surgery/ Procedure: 18  Time of Surgery/ Procedure: unknown  Hospital/Surgical Facility: [facility]  Fax number for surgical facility:   Primary Physician: Sanford Shaw  Type of Anesthesia Anticipated: to be determined    Patient has a Health Care Directive or Living Will:  NO    1. NO - Do you have a history of heart attack, stroke, stent, bypass or surgery on an artery in the head, neck, heart or legs?  2. NO - Do you ever have any pain or discomfort in your chest?  3. NO - Do you have a history of  Heart Failure?  4. NO - Are you troubled by shortness of breath when: walking on the level, up a slight hill or at night?  5. NO - Do you currently have a cold, bronchitis or other respiratory infection?  6. NO - Do you have a cough, shortness of breath or wheezing?  7. NO - Do you sometimes get pains in the calves of your legs when you walk?  8. NO - Do you or anyone in your family have previous history of blood clots?  9. NO - Do you or does anyone in your family have a serious bleeding problem such as prolonged bleeding following surgeries or cuts?  10. YES - HAVE YOU EVER HAD PROBLEMS WITH ANEMIA OR BEEN TOLD TO TAKE IRON PILLS?   11. YES - HAVE YOU HAD ANY ABNORMAL BLOOD LOSS SUCH AS BLACK, TARRY OR BLOODY STOOLS, OR ABNORMAL VAGINAL BLEEDING?   12. NO - Have you ever had a blood transfusion?  13. NO - Have you or any of your relatives ever had problems with anesthesia?  14. NO - Do you have sleep  apnea, excessive snoring or daytime drowsiness?  15. NO - Do you have any prosthetic heart valves?  16. NO - Do you have prosthetic joints?  17. NO - Is there any chance that you may be pregnant?        HPI:                                                      Brief HPI related to upcoming procedure: Patient has a history of left hip surgery, with 3 screws in place. She has pain in her left hip whenever she twists or turns. She is scheduled to have the hardware removed on 1/9/18.    See problem list for active medical problems.  Problems all longstanding and stable, except as noted/documented.  See ROS for pertinent symptoms related to these conditions.                                                                                                  .    MEDICAL HISTORY:                                                    Patient Active Problem List    Diagnosis Date Noted     Elevated alkaline phosphatase measurement 11/30/2017     Priority: Medium     ALP elevation and subsequent dropping likely due year old hip fracture: recheck at next annual exam. Sanford Shaw MD       Major depressive disorder, recurrent episode, moderate (H) 11/03/2017     Priority: Medium     LATHA (generalized anxiety disorder) 11/03/2017     Priority: Medium     Palpitations 05/29/2015     Priority: Medium     CARDIOVASCULAR SCREENING; LDL GOAL LESS THAN 160 09/17/2012     Priority: Medium     Allergic rhinitis      Priority: Medium     Allergic rhinitis  Problem list name updated by automated process. Provider to review       GERD (gastroesophageal reflux disease) 04/28/2011     Priority: Medium      Past Medical History:   Diagnosis Date     Allergic rhinitis, cause unspecified     Allergic rhinitis     Bicornate uterus      Excessive or frequent menstruation     Heavy periods     Unspecified asthma(493.90)     Asthma     Unspecified hypothyroidism     Hypothyroidism     Past Surgical History:   Procedure Laterality Date     BREAST BIOPSY,  RT/LT      Breat Biopsy RT     C/SECTION, LOW TRANSVERSE      , Low Transverse     cervical cautery       CRYOTHERAPY, CERVICAL       HYSTERECTOMY, PAP NO LONGER INDICATED       HYSTEROSCOPY       Current Outpatient Prescriptions   Medication Sig Dispense Refill     sertraline (ZOLOFT) 50 MG tablet Take 1 tablet (50 mg) by mouth daily 90 tablet 3     pseudoePHEDrine (SUDAFED) 30 MG tablet Take 2 tablets by mouth every 4 hours as needed.       ibuprofen (ADVIL,MOTRIN) 200 MG tablet Take 400 mg by mouth as needed.       Bioflavonoid Products (MULU-C) TABS Take 1,000 mg by mouth.        B Complex-C (SUPER B COMPLEX PO) Take  by mouth.       Lactobacillus (ACIDOPHILUS PO) Take  by mouth.       omeprazole (PRILOSEC) 20 MG capsule Take 20 mg by mouth daily.       Cholecalciferol (VITAMIN D3) 2000 UNIT CAPS Take  by mouth.       Zinc 50 MG CAPS Take  by mouth.       OTC products: herbals and vitamins - vitamin d, melationin, acidopholis     Allergies   Allergen Reactions     Cat Hair Extract      Chlor-Trimeton      Demerol      Morphine Nausea and Vomiting     Amoxicillin Rash      Latex Allergy: NO    Social History   Substance Use Topics     Smoking status: Former Smoker     Packs/day: 0.20     Years: 3.00     Types: Cigarettes     Quit date: 1990     Smokeless tobacco: Never Used     Alcohol use Yes      Comment: 1-2/mo     History   Drug Use No       REVIEW OF SYSTEMS:                                                    C: NEGATIVE for fever, chills, change in weight  I: NEGATIVE for worrisome rashes, moles or lesions  E: NEGATIVE for vision changes or irritation  E/M: NEGATIVE for ear, mouth and throat problems  R: NEGATIVE for significant cough or SOB  B: NEGATIVE for masses, tenderness or discharge  CV: NEGATIVE for chest pain, palpitations or peripheral edema  GI: NEGATIVE for nausea, abdominal pain, heartburn, or change in bowel habits  : NEGATIVE for frequency, dysuria, or hematuria  M:  POSITIVE for left hip pain  N: NEGATIVE for weakness, dizziness or paresthesias  E: NEGATIVE for temperature intolerance, skin/hair changes  H: NEGATIVE for bleeding problems  P: NEGATIVE for changes in mood or affect    EXAM:                                                    /70  Pulse 72  Temp 98.5  F (36.9  C) (Oral)  Wt 159 lb 8 oz (72.3 kg)  LMP 04/08/2011  SpO2 96%  BMI 26.54 kg/m2    GENERAL APPEARANCE: healthy, alert and no distress     EYES: EOMI, PERRL     HENT: ear canals and TM's normal and nose and mouth without ulcers or lesions     NECK: no adenopathy, no asymmetry, masses, or scars and thyroid normal to palpation. TMJ normal. No bruits.     RESP: lungs clear to auscultation - no rales, rhonchi or wheezes     CV: regular rates and rhythm, normal S1 S2, no S3 or S4 and no murmur, click or rub     ABDOMEN:  soft, nontender, no HSM or masses and bowel sounds normal     MS: extremities normal- no gross deformities noted, no evidence of inflammation in joints, FROM in all extremities. Calves nontender.     SKIN: no suspicious lesions or rashes     NEURO: Normal strength and tone, sensory exam grossly normal, mentation intact and speech normal. Knee reflexes normal. No tremors.     PSYCH: mentation appears normal. and affect normal/bright     LYMPHATICS: No axillary, cervical, or supraclavicular nodes    DIAGNOSTICS:                                                    No labs or EKG required for low risk surgery (cataract, skin procedure, breast biopsy, etc)  EKG 1-08-18 NSR, within normal limits (no comparison available)  Recent Labs   Lab Test  11/03/17   0909  10/14/16   1004   09/17/12   0800   HGB  13.7  15.1   < >  13.5   PLT  263   --    --   283   NA  139  138   < >  139   POTASSIUM  4.1  3.8   < >  3.9   CR  0.73  0.73   < >  0.68    < > = values in this interval not displayed.        IMPRESSION:                                                    Reason for surgery/procedure: Left hip  pain and hardware in left hip  Diagnosis/reason for consult: Anaesthesia and risks of surgery    The proposed surgical procedure is considered LOW risk.    REVISED CARDIAC RISK INDEX  The patient has the following serious cardiovascular risks for perioperative complications such as (MI, PE, VFib and 3  AV Block):  No serious cardiac risks  INTERPRETATION: 1 risks: Class II (low risk - 0.9% complication rate)    The patient has the following additional risks for perioperative complications:  No identified additional risks      ICD-10-CM    1. Preop general physical exam Z01.818    2. History of fracture of left hip Z87.81    3. Gastroesophageal reflux disease, esophagitis presence not specified K21.9        RECOMMENDATIONS:                                                      --Patient is to take all scheduled medications after surgery.    APPROVAL GIVEN to proceed with proposed procedure, without further diagnostic evaluation       Signed Electronically by: Sanford Shaw MD    Copy of this evaluation report is provided to requesting physician.    [facility] Preop Guidelines

## 2018-01-08 ENCOUNTER — ALLIED HEALTH/NURSE VISIT (OUTPATIENT)
Dept: NURSING | Facility: CLINIC | Age: 58
End: 2018-01-08
Payer: COMMERCIAL

## 2018-01-08 DIAGNOSIS — Z01.818 PREOPERATIVE EXAMINATION: Primary | ICD-10-CM

## 2018-01-08 PROCEDURE — 99207 ZZC NO CHARGE NURSE ONLY: CPT

## 2018-01-08 PROCEDURE — 93000 ELECTROCARDIOGRAM COMPLETE: CPT

## 2018-01-08 NOTE — MR AVS SNAPSHOT
After Visit Summary   1/8/2018    Natty Roa    MRN: [id number]           Patient Information     Date Of Birth          1960        Visit Information        Provider Department      1/8/2018 4:45 PM RD FP MA/LPN [facility]        Today's Diagnoses     Preoperative examination    -  1       Follow-ups after your visit        Who to contact     If you have questions or need follow up information about today's clinic visit or your schedule please contact [facility] directly at [phone].  Normal or non-critical lab and imaging results will be communicated to you by Circle Inchart, letter or phone within 4 business days after the clinic has received the results. If you do not hear from us within 7 days, please contact the clinic through [employer] or phone. If you have a critical or abnormal lab result, we will notify you by phone as soon as possible.  Submit refill requests through CleanApp or call your pharmacy and they will forward the refill request to us. Please allow 3 business days for your refill to be completed.          Additional Information About Your Visit        MyChart Information     CleanApp gives you secure access to your electronic health record. If you see a primary care provider, you can also send messages to your care team and make appointments. If you have questions, please call your primary care clinic.  If you do not have a primary care provider, please call [phone] and they will assist you.        Care EveryWhere ID     This is your Care EveryWhere ID. This could be used by other organizations to access your [facility] medical records  [id number]        Your Vitals Were     Last Period                   04/08/2011            Blood Pressure from Last 3 Encounters:   01/05/18 122/70   12/22/17 124/80   11/03/17 124/78    Weight from Last 3 Encounters:   01/05/18 159 lb 8 oz (72.3 kg)   12/22/17 158 lb 6.4 oz (71.8 kg)   11/03/17 158 lb 11.2 oz  (72 kg)              We Performed the Following     EKG 12-lead complete w/read - Clinics        Primary Care Provider Office Phone # Fax #    Sanford Shaw [phone]       [address] [facility] [address]  [facility] [address]        Equal Access to Services     JENNAALVAREZ RICOKAMRYN : Hadii aad ku hadasho Soomaali, waaxda luqadaha, qaybta kaalmada adeegyada, waxay idiin haylalan adetatyana meade lanyla ramos. So [facility] [phone].    ATENCIÓN: Si habla español, tiene a rosa disposición servicios gratuitos de asistencia lingüística. Rafaela al [phone].    We comply with applicable federal civil rights laws and [place] laws. We do not discriminate on the basis of race, color, national origin, age, disability, sex, sexual orientation, or gender identity.            Thank you!     Thank you for choosing [facility]  for your care. Our goal is always to provide you with excellent care. Hearing back from our patients is one way we can continue to improve our services. Please take a few minutes to complete the written survey that you may receive in the mail after your visit with us. Thank you!             Your Updated Medication List - Protect others around you: Learn how to safely use, store and throw away your medicines at www.disposemymeds.org.          This list is accurate as of: 1/8/18 11:59 PM.  Always use your most recent med list.                   Brand Name Dispense Instructions for use Diagnosis    ACIDOPHILUS PO      Take  by mouth.        MULU-C Tabs      Take 1,000 mg by mouth.        ibuprofen 200 MG tablet    ADVIL/MOTRIN     Take 400 mg by mouth as needed.        priLOSEC 20 MG CR capsule   Generic drug:  omeprazole      Take 20 mg by mouth daily.        pseudoePHEDrine 30 MG tablet    SUDAFED     Take 2 tablets by mouth every 4 hours as needed.        sertraline 50 MG tablet    ZOLOFT    90 tablet    Take 1 tablet (50 mg) by mouth daily    LATHA (generalized anxiety disorder), Major  depressive disorder, recurrent episode, moderate (H)       SUPER B COMPLEX PO      Take  by mouth.        vitamin D3 2000 UNITS Caps      Take  by mouth.        Zinc 50 MG Caps      Take  by mouth.

## 2018-01-09 ENCOUNTER — TRANSFERRED RECORDS (OUTPATIENT)
Dept: HEALTH INFORMATION MANAGEMENT | Facility: CLINIC | Age: 58
End: 2018-01-09

## 2018-01-24 ENCOUNTER — TRANSFERRED RECORDS (OUTPATIENT)
Dept: HEALTH INFORMATION MANAGEMENT | Facility: CLINIC | Age: 58
End: 2018-01-24

## 2018-01-29 ENCOUNTER — TRANSFERRED RECORDS (OUTPATIENT)
Dept: HEALTH INFORMATION MANAGEMENT | Facility: CLINIC | Age: 58
End: 2018-01-29

## 2018-02-05 ENCOUNTER — TRANSFERRED RECORDS (OUTPATIENT)
Dept: HEALTH INFORMATION MANAGEMENT | Facility: CLINIC | Age: 58
End: 2018-02-05

## 2018-02-27 ENCOUNTER — MYC MEDICAL ADVICE (OUTPATIENT)
Dept: FAMILY MEDICINE | Facility: CLINIC | Age: 58
End: 2018-02-27

## 2018-02-27 DIAGNOSIS — F33.1 MAJOR DEPRESSIVE DISORDER, RECURRENT EPISODE, MODERATE (H): ICD-10-CM

## 2018-02-27 DIAGNOSIS — F41.1 GAD (GENERALIZED ANXIETY DISORDER): ICD-10-CM

## 2018-02-27 NOTE — TELEPHONE ENCOUNTER
Dr. Shaw    See pt message    Pharm cued if you want to increase the dose of sertraline to 75mg daily    Last OV 1/5/18    Daria Newman RN   [facility]

## 2018-02-28 NOTE — TELEPHONE ENCOUNTER
Pt notified per [employer]  New script has been sent    Daria Newman RN   [facility]

## 2018-09-14 ENCOUNTER — MYC MEDICAL ADVICE (OUTPATIENT)
Dept: FAMILY MEDICINE | Facility: CLINIC | Age: 58
End: 2018-09-14

## 2018-09-14 DIAGNOSIS — F33.1 MAJOR DEPRESSIVE DISORDER, RECURRENT EPISODE, MODERATE (H): Primary | ICD-10-CM

## 2018-09-14 ASSESSMENT — PATIENT HEALTH QUESTIONNAIRE - PHQ9
SUM OF ALL RESPONSES TO PHQ QUESTIONS 1-9: 2
10. IF YOU CHECKED OFF ANY PROBLEMS, HOW DIFFICULT HAVE THESE PROBLEMS MADE IT FOR YOU TO DO YOUR WORK, TAKE CARE OF THINGS AT HOME, OR GET ALONG WITH OTHER PEOPLE: NOT DIFFICULT AT ALL
SUM OF ALL RESPONSES TO PHQ QUESTIONS 1-9: 2

## 2018-09-14 NOTE — TELEPHONE ENCOUNTER
Dr. Shaw,     Please see mychart message from patient.    Current dose (75 mg). Pt does not currently have any upcoming appointments scheduled with you. Would you like OV first to discuss?    PHQ-9 SCORE 10/14/2016 12/22/2017 9/14/2018   Total Score MyChart - - 2 (Minimal depression)   Total Score 18 4 2         Cued rx for Zoloft 100 mg tablet.     Please review/sign or advise.    Ashley Barber RN  [facility]

## 2018-09-15 ASSESSMENT — PATIENT HEALTH QUESTIONNAIRE - PHQ9: SUM OF ALL RESPONSES TO PHQ QUESTIONS 1-9: 2

## 2018-09-17 RX ORDER — SERTRALINE HYDROCHLORIDE 100 MG/1
100 TABLET, FILM COATED ORAL DAILY
Qty: 90 TABLET | Refills: 0 | Status: SHIPPED | OUTPATIENT
Start: 2018-09-17 | End: 2018-12-12

## 2018-12-12 ENCOUNTER — OFFICE VISIT (OUTPATIENT)
Dept: FAMILY MEDICINE | Facility: CLINIC | Age: 58
End: 2018-12-12
Payer: COMMERCIAL

## 2018-12-12 VITALS
TEMPERATURE: 98.3 F | WEIGHT: 160 LBS | RESPIRATION RATE: 16 BRPM | BODY MASS INDEX: 26.63 KG/M2 | SYSTOLIC BLOOD PRESSURE: 110 MMHG | OXYGEN SATURATION: 96 % | DIASTOLIC BLOOD PRESSURE: 78 MMHG | HEART RATE: 81 BPM

## 2018-12-12 DIAGNOSIS — L72.0 EPITHELIAL CYST: ICD-10-CM

## 2018-12-12 DIAGNOSIS — F33.1 MAJOR DEPRESSIVE DISORDER, RECURRENT EPISODE, MODERATE (H): ICD-10-CM

## 2018-12-12 DIAGNOSIS — Z00.01 ENCOUNTER FOR ROUTINE ADULT MEDICAL EXAM WITH ABNORMAL FINDINGS: Primary | ICD-10-CM

## 2018-12-12 LAB
ALBUMIN SERPL-MCNC: 3.8 G/DL (ref 3.4–5)
ALP SERPL-CCNC: 115 U/L (ref 40–150)
ALT SERPL W P-5'-P-CCNC: 35 U/L (ref 0–50)
ANION GAP SERPL CALCULATED.3IONS-SCNC: 7 MMOL/L (ref 3–14)
AST SERPL W P-5'-P-CCNC: 26 U/L (ref 0–45)
BILIRUB SERPL-MCNC: 0.3 MG/DL (ref 0.2–1.3)
BUN SERPL-MCNC: 20 MG/DL (ref 7–30)
CALCIUM SERPL-MCNC: 9.2 MG/DL (ref 8.5–10.1)
CHLORIDE SERPL-SCNC: 104 MMOL/L (ref 94–109)
CHOLEST SERPL-MCNC: 219 MG/DL
CO2 SERPL-SCNC: 27 MMOL/L (ref 20–32)
CREAT SERPL-MCNC: 0.7 MG/DL (ref 0.52–1.04)
GFR SERPL CREATININE-BSD FRML MDRD: 87 ML/MIN/1.7M2
GLUCOSE SERPL-MCNC: 90 MG/DL (ref 70–99)
HDLC SERPL-MCNC: 57 MG/DL
LDLC SERPL CALC-MCNC: 129 MG/DL
NONHDLC SERPL-MCNC: 162 MG/DL
POTASSIUM SERPL-SCNC: 3.9 MMOL/L (ref 3.4–5.3)
PROT SERPL-MCNC: 7.9 G/DL (ref 6.8–8.8)
SODIUM SERPL-SCNC: 138 MMOL/L (ref 133–144)
TRIGL SERPL-MCNC: 164 MG/DL

## 2018-12-12 PROCEDURE — 90471 IMMUNIZATION ADMIN: CPT | Performed by: FAMILY MEDICINE

## 2018-12-12 PROCEDURE — 99212 OFFICE O/P EST SF 10 MIN: CPT | Mod: 25 | Performed by: FAMILY MEDICINE

## 2018-12-12 PROCEDURE — 90750 HZV VACC RECOMBINANT IM: CPT | Performed by: FAMILY MEDICINE

## 2018-12-12 PROCEDURE — 36415 COLL VENOUS BLD VENIPUNCTURE: CPT | Performed by: FAMILY MEDICINE

## 2018-12-12 PROCEDURE — 80053 COMPREHEN METABOLIC PANEL: CPT | Performed by: FAMILY MEDICINE

## 2018-12-12 PROCEDURE — 99396 PREV VISIT EST AGE 40-64: CPT | Mod: 25 | Performed by: FAMILY MEDICINE

## 2018-12-12 PROCEDURE — 80061 LIPID PANEL: CPT | Performed by: FAMILY MEDICINE

## 2018-12-12 RX ORDER — CEPHALEXIN 500 MG/1
CAPSULE ORAL
Refills: 0 | COMMUNITY
Start: 2018-02-05 | End: 2018-12-12

## 2018-12-12 RX ORDER — HYDROCODONE BITARTRATE AND ACETAMINOPHEN 5; 325 MG/1; MG/1
TABLET ORAL
Refills: 0 | COMMUNITY
Start: 2018-01-09 | End: 2020-12-31

## 2018-12-12 RX ORDER — SERTRALINE HYDROCHLORIDE 100 MG/1
100 TABLET, FILM COATED ORAL DAILY
Qty: 90 TABLET | Refills: 3 | Status: SHIPPED | OUTPATIENT
Start: 2018-12-12 | End: 2019-04-15 | Stop reason: DRUGHIGH

## 2018-12-12 RX ORDER — HYDROXYZINE PAMOATE 25 MG/1
CAPSULE ORAL
Refills: 1 | COMMUNITY
Start: 2018-01-09

## 2018-12-12 RX ORDER — CEPHALEXIN 500 MG/1
CAPSULE ORAL
Qty: 12 CAPSULE | Refills: 1 | Status: SHIPPED | OUTPATIENT
Start: 2018-12-12

## 2018-12-12 NOTE — PROGRESS NOTES
SUBJECTIVE:   Natty Roa is a 58 year old female who presents to clinic today for the following health issues:    Respiratory  She denies shortness of breath.    Cardiovascular  Patient denies chest pain.    GI  Patient has not had troubles with heartburn as she is currently taking Omeprazole regularly. She denies bowel issues.    MS  Patient had some screws removed from her hip after a hip fracture in 2016. This has allowed her great relief from hip pain and has increased flexibility.       She has some issues with stress incontinence. Patient denies vaginal discharge.    Psych  Patient is interested in tapering off Zoloft. She reports a lack of energy, and wants to sleep all the time. Patient has tried taking it at all different times of the day without success.     Patient reports feeling as good about her marriage as she ever has. She recently finished couples counseling which she believes has been very helpful.    Breast Cancer Screening  She gets regular mammograms.    Vision Screening  Patient gets regular eye exams.    Social History  She is a new grandmother, her son recently had twins.      Problem list and histories reviewed & adjusted, as indicated.  Additional history: as documented    Patient Active Problem List   Diagnosis     GERD (gastroesophageal reflux disease)     CARDIOVASCULAR SCREENING; LDL GOAL LESS THAN 160     Allergic rhinitis     Palpitations     Major depressive disorder, recurrent episode, moderate (H)     LATHA (generalized anxiety disorder)     Elevated alkaline phosphatase measurement     Adjustment insomnia     Lesion of bladder     Past Surgical History:   Procedure Laterality Date     BREAST BIOPSY, RT/LT      Breat Biopsy RT     C TOTAL HIP ARTHROPLASTY Left 2016     C/SECTION, LOW TRANSVERSE      , Low Transverse     cervical cautery       CRYOTHERAPY, CERVICAL       HYSTERECTOMY, PAP NO LONGER INDICATED       HYSTEROSCOPY       total hip  removal Left 2018    hardware removal       Social History     Tobacco Use     Smoking status: Former Smoker     Packs/day: 0.20     Years: 3.00     Pack years: 0.60     Types: Cigarettes     Last attempt to quit: 1990     Years since quittin.6     Smokeless tobacco: Never Used   Substance Use Topics     Alcohol use: Yes     Comment: 1-2/mo     Family History   Problem Relation Age of Onset     Hypertension Mother      Gastrointestinal Disease Mother      Alzheimer Disease Maternal Grandmother      Cerebrovascular Disease Maternal Grandfather      Cancer - colorectal Maternal Grandfather      Breast Cancer Paternal Grandmother      Cerebrovascular Disease Paternal Grandmother      Cancer Paternal Grandfather      Gastrointestinal Disease Brother          Allergies   Allergen Reactions     Cat Hair Extract      Chlor-Trimeton      Demerol      Morphine Nausea and Vomiting     Amoxicillin Rash     Recent Labs   Lab Test 17  0909 10/14/16  1004 05/29/15  1031 14  1101   LDL  --  110* 72 102   HDL  --  68 53 43*   TRIG  --  101 69 172*   ALT 34 48 43 61*   CR 0.73 0.73 0.74 0.71   GFRESTIMATED 83 83 82 87   GFRESTBLACK >90 >90   GFR Calc   >90   GFR Calc   >90   POTASSIUM 4.1 3.8 4.1 4.0   TSH  --   --  1.41  --         Reviewed and updated as needed this visit by clinical staff  Allergies       Reviewed and updated as needed this visit by Provider         ROS:  Denies chest pain, shortness of breath, bowel issues. Remainder of ROS is negative unless otherwise noted above or in HPI.    This document serves as a record of the services and decisions personally performed and made by Sanford Shaw MD. It was created on his behalf by Nav Vail, trained medical scribe. The creation of this document is based on the provider's statements to the medical scribe.  Nav Vail 8:21 AM 2018  OBJECTIVE:     /78   Pulse 81   Temp 98.3  F (36.8  C)  (Oral)   Resp 16   Wt 72.6 kg (160 lb)   LMP 04/08/2011   SpO2 96%   BMI 26.63 kg/m    Body mass index is 26.63 kg/m .  GENERAL: healthy, alert and no distress  EYES: Eyes grossly normal to inspection, PERRL and conjunctivae and sclerae normal  HENT: ear canals and TM's normal, nose and mouth without ulcers or lesions  NECK: no adenopathy, no asymmetry, masses, or scars and thyroid normal to palpation  RESP: lungs clear to auscultation - no rales, rhonchi or wheezes  BREAST: normal without masses, tenderness or nipple discharge and no palpable axillary masses or adenopathy  CV: regular rate and rhythm, normal S1 S2, no S3 or S4, no murmur, click or rub, no peripheral edema and peripheral pulses strong, no bruits  ABDOMEN: soft, nontender, no hepatosplenomegaly, no masses and bowel sounds normal  MS: no gross musculoskeletal defects noted, no edema  SKIN: no suspicious lesions or rashes, 1 inch erythematic, warm, tender lesion on the anterior aspect of the left axilla without drainage  NEURO: Normal strength and tone, mentation intact and speech normal  PSYCH: mentation appears normal, affect normal/bright    Diagnostic Test Results:  No results found for this or any previous visit (from the past 24 hour(s)).    ASSESSMENT/PLAN:     (Z00.01) Encounter for routine adult medical exam with abnormal findings  Comment: Patient is doing well. Routine physical and lab work completed.  Plan: Comprehensive metabolic panel, Lipid panel         reflex to direct LDL Fasting, ZOSTER VACCINE         RECOMBINANT ADJUVANTED IM NJX        Will notify with results. Follow up as needed.    (L72.0) Epithelial cyst  (primary encounter diagnosis)  Comment: Uncontrolled.  Plan: GENERAL SURG ADULT REFERRAL, cephALEXin         (KEFLEX) 500 MG capsule        Advised patient to start Cephalexin if not benefiting from warm compress twice daily. She will follow up with general surgeon if not improving. Follow up as needed.    (F33.1) Major  depressive disorder, recurrent episode, moderate (H)  Comment: Improving.  Plan: sertraline (ZOLOFT) 100 MG tablet        Instructed patient to taper off Zoloft over the next 3 weeks. Follow up as needed.        Patient Instructions   Taper off sertraline over 3 weeks.      The information in this document, created by the medical scribe for me, accurately reflects the services I personally performed and the decisions made by me. I have reviewed and approved this document for accuracy prior to leaving the patient care area.  December 12, 2018 8:21 AM    Sanford Shaw MD  [facility]

## 2018-12-13 PROBLEM — F33.1 MAJOR DEPRESSIVE DISORDER, RECURRENT EPISODE, MODERATE (H): Status: RESOLVED | Noted: 2017-11-03 | Resolved: 2018-12-13

## 2018-12-15 NOTE — RESULT ENCOUNTER NOTE
Jorge Luis Luz: Your recent results are mostly normal with cholesterol slightly up. However, you're still at low risk for heart disease (see below). Continue diet and exercise; contact if problems tapering sertraline. Have a nice holiday- nice to see you!     Sanford    The 10-year ASCVD risk score (Gabriela BRAR Jr., et al., 2013) is: 1.7%    Values used to calculate the score:      Age: 58 years      Sex: Female      Is Non- : No      Diabetic: No      Tobacco smoker: No      Systolic Blood Pressure: 110 mmHg      Is BP treated: No      HDL Cholesterol: 57 mg/dL      Total Cholesterol: 219 mg/dL

## 2019-04-02 DIAGNOSIS — F41.1 GAD (GENERALIZED ANXIETY DISORDER): ICD-10-CM

## 2019-04-02 RX ORDER — SERTRALINE HYDROCHLORIDE 25 MG/1
TABLET, FILM COATED ORAL
Qty: 30 TABLET | Refills: 0 | Status: SHIPPED | OUTPATIENT
Start: 2019-04-02 | End: 2019-04-15 | Stop reason: DRUGHIGH

## 2019-04-02 NOTE — TELEPHONE ENCOUNTER
Writer notes prescription restarted 03/20/2019 with taper instructions.     Called patient, patient states she does not have enough medication to last until follow up appointment on 04/15/2019. Bridge supply provided to patient    Jane Martin RN  Triage Nurse

## 2019-04-02 NOTE — TELEPHONE ENCOUNTER
"Requested Prescriptions   Pending Prescriptions Disp Refills     sertraline (ZOLOFT) 25 MG tablet [Pharmacy Med Name: SERTRALINE HCL 25 MG TABLET] 30 tablet 0     Sig: TAKE 1-2 TABLETS BY MOUTH ONCE DAILY    SSRIs Protocol Passed - 4/2/2019  4:11 PM       Passed - Recent (12 mo) or future (30 days) visit within the authorizing provider's specialty    Patient had office visit in the last 12 months or has a visit in the next 30 days with authorizing provider or within the authorizing provider's specialty.  See \"Patient Info\" tab in inbasket, or \"Choose Columns\" in Meds & Orders section of the refill encounter.             Passed - Medication is active on med list       Passed - Patient is age 18 or older       Passed - No active pregnancy on record       Passed - No positive pregnancy test in last 12 months          "

## 2019-04-15 ENCOUNTER — OFFICE VISIT (OUTPATIENT)
Dept: FAMILY MEDICINE | Facility: CLINIC | Age: 59
End: 2019-04-15
Payer: COMMERCIAL

## 2019-04-15 VITALS
HEIGHT: 66 IN | WEIGHT: 171 LBS | SYSTOLIC BLOOD PRESSURE: 122 MMHG | TEMPERATURE: 98 F | DIASTOLIC BLOOD PRESSURE: 76 MMHG | OXYGEN SATURATION: 97 % | BODY MASS INDEX: 27.48 KG/M2 | HEART RATE: 74 BPM

## 2019-04-15 DIAGNOSIS — F41.1 GAD (GENERALIZED ANXIETY DISORDER): Primary | ICD-10-CM

## 2019-04-15 PROCEDURE — 99213 OFFICE O/P EST LOW 20 MIN: CPT | Performed by: FAMILY MEDICINE

## 2019-04-15 ASSESSMENT — PATIENT HEALTH QUESTIONNAIRE - PHQ9
5. POOR APPETITE OR OVEREATING: NOT AT ALL
SUM OF ALL RESPONSES TO PHQ QUESTIONS 1-9: 2

## 2019-04-15 ASSESSMENT — ANXIETY QUESTIONNAIRES
3. WORRYING TOO MUCH ABOUT DIFFERENT THINGS: NOT AT ALL
1. FEELING NERVOUS, ANXIOUS, OR ON EDGE: NOT AT ALL
GAD7 TOTAL SCORE: 0
6. BECOMING EASILY ANNOYED OR IRRITABLE: NOT AT ALL
5. BEING SO RESTLESS THAT IT IS HARD TO SIT STILL: NOT AT ALL
IF YOU CHECKED OFF ANY PROBLEMS ON THIS QUESTIONNAIRE, HOW DIFFICULT HAVE THESE PROBLEMS MADE IT FOR YOU TO DO YOUR WORK, TAKE CARE OF THINGS AT HOME, OR GET ALONG WITH OTHER PEOPLE: NOT DIFFICULT AT ALL
7. FEELING AFRAID AS IF SOMETHING AWFUL MIGHT HAPPEN: NOT AT ALL
2. NOT BEING ABLE TO STOP OR CONTROL WORRYING: NOT AT ALL

## 2019-04-15 ASSESSMENT — MIFFLIN-ST. JEOR: SCORE: 1375.27

## 2019-04-15 NOTE — PROGRESS NOTES
"  SUBJECTIVE:   Natty Roa is a 58 year old female who presents to clinic today for the following   health issues:    Depression and Anxiety Follow-Up    Status since last visit: Improved since getting back on the medication.     Other associated symptoms:None    Complicating factors:     Significant life event: Yes-  Grand kids were born 10 months ago- twins.      Current substance abuse: None    PHQ 12/22/2017 9/14/2018 4/15/2019   PHQ-9 Total Score 4 2 2   Q9: Thoughts of better off dead/self-harm past 2 weeks Not at all Not at all Not at all     LATHA-7 SCORE 12/22/2017 4/15/2019   Total Score 2 0       PHQ-9  English  PHQ-9   Any Language  LATHA-7  Suicide Assessment Five-step Evaluation and Treatment (SAFE-T)    Amount of exercise or physical activity: 2-3 days/week for an average of 15-30 minutes    Problems taking medications regularly: No    Medication side effects: none    Diet: high protein, protein shakes    Questions/Concerns: none    Patient presents for a follow up of Sertraline. She started taking 25 mg for one week, has been taking 50 mg daily since. She states that it makes her feel more \"even keeled\". It has allowed her to do basic chores and activities more easily. Patient would like to continue taking the medication for the foreseeable future. She denies side effects or suicidal thoughts.        Additional history: as documented    Reviewed  and updated as needed this visit by clinical staff  Tobacco  Allergies  Meds  Problems  Med Hx  Surg Hx  Fam Hx  Soc Hx          Reviewed and updated as needed this visit by Provider  Problems         Patient Active Problem List   Diagnosis     GERD (gastroesophageal reflux disease)     CARDIOVASCULAR SCREENING; LDL GOAL LESS THAN 160     Allergic rhinitis     LATHA (generalized anxiety disorder)     Elevated alkaline phosphatase measurement     Adjustment insomnia     Lesion of bladder     Past Surgical History:   Procedure Laterality Date     BREAST " BIOPSY, RT/LT      Breat Biopsy RT     C TOTAL HIP ARTHROPLASTY Left 2016     C/SECTION, LOW TRANSVERSE      , Low Transverse     cervical cautery       CRYOTHERAPY, CERVICAL       HYSTERECTOMY, PAP NO LONGER INDICATED       HYSTEROSCOPY       total hip removal Left 2018    hardware removal       Social History     Tobacco Use     Smoking status: Former Smoker     Packs/day: 0.20     Years: 3.00     Pack years: 0.60     Types: Cigarettes     Last attempt to quit: 1990     Years since quittin.9     Smokeless tobacco: Never Used   Substance Use Topics     Alcohol use: Yes     Comment: 1-2/mo     Family History   Problem Relation Age of Onset     Hypertension Mother      Gastrointestinal Disease Mother      Alzheimer Disease Maternal Grandmother      Cerebrovascular Disease Maternal Grandfather      Cancer - colorectal Maternal Grandfather      Breast Cancer Paternal Grandmother      Cerebrovascular Disease Paternal Grandmother      Cancer Paternal Grandfather      Gastrointestinal Disease Brother          Current Outpatient Medications   Medication Sig Dispense Refill     B Complex-C (SUPER B COMPLEX PO) Take  by mouth.       Bioflavonoid Products (MULU-C) TABS Take 1,000 mg by mouth.        cephALEXin (KEFLEX) 500 MG capsule TAKE 1 CAPSULE BY MOUTH FOUR TIMES DAILY FOR 3 DAYS 12 capsule 1     Cholecalciferol (VITAMIN D3) 2000 UNIT CAPS Take  by mouth.       HYDROcodone-acetaminophen (NORCO) 5-325 MG tablet TAKE 1-2 TABLETS BY MOUTH EVERY 4-6 HOURS AS NEEDED.  0     hydrOXYzine (VISTARIL) 25 MG capsule TAKE 1-2 CAPSULES BY MOUTH EVERY 4-6 HOURS AS NEEDED.  1     ibuprofen (ADVIL,MOTRIN) 200 MG tablet Take 400 mg by mouth as needed.       Lactobacillus (ACIDOPHILUS PO) Take  by mouth.       omeprazole (PRILOSEC) 20 MG capsule Take 20 mg by mouth daily.       pseudoePHEDrine (SUDAFED) 30 MG tablet Take 2 tablets by mouth every 4 hours as needed.       Zinc 50 MG CAPS Take  by mouth.    "    Allergies   Allergen Reactions     Cat Hair Extract      Chlor-Trimeton      Demerol      Morphine Nausea and Vomiting     Amoxicillin Rash     BP Readings from Last 3 Encounters:   04/15/19 122/76   12/12/18 110/78   01/05/18 122/70    Wt Readings from Last 3 Encounters:   04/15/19 77.6 kg (171 lb)   12/12/18 72.6 kg (160 lb)   01/05/18 72.3 kg (159 lb 8 oz)                  Labs reviewed in EPIC    ROS:  Remainder of ROS is negative unless otherwise noted above or in HPI.    This document serves as a record of the services and decisions personally performed and made by Sanford Shaw MD. It was created on his behalf by Nav Vail, trained medical scribe. The creation of this document is based on the provider's statements to the medical scribe.  Nav Vail 9:03 AM April 15, 2019    OBJECTIVE:     /76   Pulse 74   Temp 98  F (36.7  C) (Oral)   Ht 1.665 m (5' 5.55\")   Wt 77.6 kg (171 lb)   LMP 04/08/2011   SpO2 97%   BMI 27.98 kg/m    Body mass index is 27.98 kg/m .  GENERAL: healthy, alert and no distress  NEURO: Normal strength and tone, mentation intact and speech normal  PSYCH: mentation appears normal, affect normal/bright    Diagnostic Test Results:  none     ASSESSMENT/PLAN:   (F41.1) LATHA (generalized anxiety disorder)  (primary encounter diagnosis)  Comment: Improving.  Plan: sertraline (ZOLOFT) 50 MG tablet        Continue taking medication. Follow up as needed.      Patient Instructions   Let me know sometime in the fall if you need a change in your medication.        The information in this document, created by the medical scribe for me, accurately reflects the services I personally performed and the decisions made by me. I have reviewed and approved this document for accuracy prior to leaving the patient care area.  April 15, 2019 9:03 AM    Sanford Shaw MD  [facility]        "

## 2019-04-16 ASSESSMENT — ANXIETY QUESTIONNAIRES: GAD7 TOTAL SCORE: 0

## 2019-04-17 DIAGNOSIS — F41.1 GAD (GENERALIZED ANXIETY DISORDER): ICD-10-CM

## 2019-04-17 RX ORDER — SERTRALINE HYDROCHLORIDE 25 MG/1
TABLET, FILM COATED ORAL
Qty: 30 TABLET | Refills: 0 | OUTPATIENT
Start: 2019-04-17

## 2019-04-17 NOTE — TELEPHONE ENCOUNTER
"Requested Prescriptions   Pending Prescriptions Disp Refills     sertraline (ZOLOFT) 25 MG tablet [Pharmacy Med Name: SERTRALINE HCL 25 MG TABLET] 30 tablet 0     Sig: TAKE 1-2 TABLETS BY MOUTH ONCE DAILY       SSRIs Protocol Passed - 4/17/2019 12:48 PM        Passed - Recent (12 mo) or future (30 days) visit within the authorizing provider's specialty     Patient had office visit in the last 12 months or has a visit in the next 30 days with authorizing provider or within the authorizing provider's specialty.  See \"Patient Info\" tab in inbasket, or \"Choose Columns\" in Meds & Orders section of the refill encounter.              Passed - Medication is active on med list        Passed - Patient is age 18 or older        Passed - No active pregnancy on record        Passed - No positive pregnancy test in last 12 months          "

## 2019-04-17 NOTE — TELEPHONE ENCOUNTER
Writer notes prescription sent 04/15/2019 for sertraline (ZOLOFT) 50 MG tablet by provider. Patient seen in clinic on 04/15/2019:  (F41.1) LATHA (generalized anxiety disorder)  (primary encounter diagnosis)  Comment: Improving.  Plan: sertraline (ZOLOFT) 50 MG tablet        Continue taking medication. Follow up as needed.    Called pharmacy, informed pharmacy that previous prescription was written for taper dose. Patient is currently taking 50 mg. Pharmacy verbalized understanding    Jane Martin RN  Triage Nurse

## 2019-05-28 ENCOUNTER — TRANSFERRED RECORDS (OUTPATIENT)
Dept: HEALTH INFORMATION MANAGEMENT | Facility: CLINIC | Age: 59
End: 2019-05-28

## 2019-09-28 ENCOUNTER — HEALTH MAINTENANCE LETTER (OUTPATIENT)
Age: 59
End: 2019-09-28

## 2019-12-30 ENCOUNTER — TELEPHONE (OUTPATIENT)
Dept: FAMILY MEDICINE | Facility: CLINIC | Age: 59
End: 2019-12-30

## 2019-12-30 NOTE — LETTER
December 30, 2019      Natty Roa  [address], [place] [address]        Dear Natty    In order to ensure we are providing the best quality care, we have reviewed your chart and see that you are due for:    1. Colonoscopy  2. Mammogram    Please call the clinic at your earliest convenience to schedule an appointment.  If you have completed these please contact our office via phone or [employer] to update our records.  We would like to know the date (approximately month and year), the result, and ideally where the procedure was performed.    Thank you for trusting us with your health care.      Sincerely,    Care Team for Sanford Shaw MD

## 2019-12-30 NOTE — TELEPHONE ENCOUNTER
Panel Management Review      Patient has the following on her problem list: None      Composite cancer screening  Chart review shows that this patient is due/due soon for the following Mammogram and Colonoscopy  Summary:    Patient is due/failing the following:   COLONOSCOPY and MAMMOGRAM    Action needed:   Patient needs colonoscopy and mammogram.    Type of outreach:    Sent letter.    Questions for provider review:    None                                                                                                                                    Kassandra Parham CMA  December 30, 2019 3:26 PM       Chart routed to none.

## 2020-03-15 ENCOUNTER — HEALTH MAINTENANCE LETTER (OUTPATIENT)
Age: 60
End: 2020-03-15

## 2020-04-06 DIAGNOSIS — F41.1 GAD (GENERALIZED ANXIETY DISORDER): ICD-10-CM

## 2020-04-07 NOTE — TELEPHONE ENCOUNTER
Prescription approved per [facility] Refill Protocol.    Ana Laura Brice RN   [facility]

## 2020-05-23 ENCOUNTER — TRANSFERRED RECORDS (OUTPATIENT)
Dept: HEALTH INFORMATION MANAGEMENT | Facility: CLINIC | Age: 60
End: 2020-05-23

## 2020-08-21 ENCOUNTER — RECORDS - HEALTHEAST (OUTPATIENT)
Dept: ADMINISTRATIVE | Facility: OTHER | Age: 60
End: 2020-08-21

## 2020-08-21 ENCOUNTER — HOSPITAL ENCOUNTER (OUTPATIENT)
Dept: ULTRASOUND IMAGING | Facility: CLINIC | Age: 60
Discharge: HOME | End: 2020-08-21

## 2020-08-21 DIAGNOSIS — R60.0 LEG EDEMA: ICD-10-CM

## 2020-12-28 NOTE — PATIENT INSTRUCTIONS
Recommend mediterranean or DASH type diets, exercise.    The 10-year ASCVD risk score (Gabriela BRAR Jr., et al., 2013) is: 2.8%    Values used to calculate the score:      Age: 60 years      Sex: Female      Is Non- : No      Diabetic: No      Tobacco smoker: No      Systolic Blood Pressure: 128 mmHg      Is BP treated: No      HDL Cholesterol: 57 mg/dL      Total Cholesterol: 219 mg/dL    Preventive Health Recommendations  Female Ages 50 - 64    Yearly exam: See your health care provider every year in order to  o Review health changes.   o Discuss preventive care.    o Review your medicines if your doctor has prescribed any.      Get a Pap test every three years (unless you have an abnormal result and your provider advises testing more often).    If you get Pap tests with HPV test, you only need to test every 5 years, unless you have an abnormal result.     You do not need a Pap test if your uterus was removed (hysterectomy) and you have not had cancer.    You should be tested each year for STDs (sexually transmitted diseases) if you're at risk.     Have a mammogram every 1 to 2 years.    Have a colonoscopy at age 50, or have a yearly FIT test (stool test). These exams screen for colon cancer.      Have a cholesterol test every 5 years, or more often if advised.    Have a diabetes test (fasting glucose) every three years. If you are at risk for diabetes, you should have this test more often.     If you are at risk for osteoporosis (brittle bone disease), think about having a bone density scan (DEXA).    Shots: Get a flu shot each year. Get a tetanus shot every 10 years.    Nutrition:     Eat at least 5 servings of fruits and vegetables each day.    Eat whole-grain bread, whole-wheat pasta and brown rice instead of white grains and rice.    Get adequate Calcium and Vitamin D.     Lifestyle    Exercise at least 150 minutes a week (30 minutes a day, 5 days a week). This will help you control your  weight and prevent disease.    Limit alcohol to one drink per day.    No smoking.     Wear sunscreen to prevent skin cancer.     See your dentist every six months for an exam and cleaning.    See your eye doctor every 1 to 2 years.

## 2020-12-28 NOTE — PROGRESS NOTES
SUBJECTIVE:   CC: Natty Roa is an 60 year old woman who presents for preventive health visit.     Patient has been advised of split billing requirements and indicates understanding: Yes  Healthy Habits:    Do you get at least three servings of calcium containing foods daily (dairy, green leafy vegetables, etc.)? yes    Amount of exercise or daily activities, outside of work: not right now     Problems taking medications regularly No    Medication side effects: No    Have you had an eye exam in the past two years? no    Do you see a dentist twice per year? yes    Do you have sleep apnea, excessive snoring or daytime drowsiness?yes- daytime drowsiness and snoring.       GERD/Heartburn  Onset: many years    Description:     Burning in chest: YES    Intensity: moderate    Progression of Symptoms: same and intermittent    Accompanying Signs & Symptoms:  Does it feel like food gets stuck: no  Nausea: occasionally   Vomiting (bloody?): no  Abdominal Pain: YES  Black-Tarry stools: no:  Bloody stools: no    History:   Previous ulcers: no    Precipitating factors:   Caffeine use: YES  Alcohol use: YES  NSAID/Aspirin use: YES- two 200 mg ibuprofen at bedtime due to   Tobacco use: no  Worse with no particular food or drink.    Alleviating factors:  Alternated PPI and H2B every few days.    Therapies Tried and outcome:OTC H2 blocker:omeprazole and proton pump inhibitor: famotidine    Today's PHQ-2 Score:   PHQ-2 ( 1999 [employer]) 4/15/2019 11/30/2016   Q1: Little interest or pleasure in doing things 0 0   Q2: Feeling down, depressed or hopeless 0 0   PHQ-2 Score 0 0   Q1: Little interest or pleasure in doing things - -   Q2: Feeling down, depressed or hopeless - -   PHQ-2 Score - -     Abuse: Current or Past(Physical, Sexual or Emotional)- No  Do you feel safe in your environment? Yes    Have you ever done Advance Care Planning? (For example, a Health Directive, POLST, or a discussion with a medical provider or your loved  ones about your wishes): No, advance care planning information given to patient to review.  Patient plans to discuss their wishes with loved ones or provider.      Social History     Tobacco Use     Smoking status: Former Smoker     Packs/day: 0.20     Years: 3.00     Pack years: 0.60     Types: Cigarettes     Quit date: 1990     Years since quittin.6     Smokeless tobacco: Never Used   Substance Use Topics     Alcohol use: Yes     Comment: 1-2/mo     If you drink alcohol do you typically have >3 drinks per day or >7 drinks per week? No                     Reviewed orders with patient.  Reviewed health maintenance and updated orders accordingly - Yes  Lab work is in process  Labs reviewed in [facility]    Mammogram Screening: Patient over age 50, mutual decision to screen reflected in health maintenance.    Pertinent mammograms are reviewed under the imaging tab.  History of abnormal Pap smear: NO - age 30- 65 PAP every 3 years recommended     Reviewed and updated as needed this visit by clinical staff  Tobacco  Allergies  Meds   Med Hx            Reviewed and updated as needed this visit by Provider      Med Hx               ROS:  CONSTITUTIONAL: NEGATIVE for fever, chills, change in weight  INTEGUMENTARY/SKIN: NEGATIVE for worrisome rashes, moles or lesions  EYES: NEGATIVE for vision changes or irritation  ENT: NEGATIVE for ear, mouth and throat problems  RESP: NEGATIVE for significant cough or SOB  BREAST: NEGATIVE for masses, tenderness or discharge  CV: NEGATIVE for chest pain, palpitations or peripheral edema  GI: NEGATIVE for nausea, abdominal pain, heartburn, or change in bowel habits  : NEGATIVE for unusual urinary or vaginal symptoms. No vaginal bleeding.  MUSCULOSKELETAL: NEGATIVE for significant arthralgias or myalgia  NEURO: NEGATIVE for weakness, dizziness or paresthesias  PSYCHIATRIC: NEGATIVE for changes in mood or affect     OBJECTIVE:   /68   Pulse 69   Temp 98.5  F (36.9  C)  "(Temporal)   Resp 14   Wt 79.6 kg (175 lb 8 oz)   LMP 04/08/2011   SpO2 98%   BMI 28.72 kg/m    EXAM:  GENERAL: healthy, alert and no distress  EYES: Eyes grossly normal to inspection, PERRL and conjunctivae and sclerae normal  HENT: ear canals and TM's normal, nose and mouth without ulcers or lesions  NECK: no adenopathy, no asymmetry, masses, or scars and thyroid normal to palpation  RESP: lungs clear to auscultation - no rales, rhonchi or wheezes  BREAST: normal without masses, tenderness or nipple discharge and no palpable axillary masses or adenopathy  CV: regular rate and rhythm, normal S1 S2, no S3 or S4, no murmur, click or rub, no peripheral edema and peripheral pulses strong  ABDOMEN: soft, nontender, no hepatosplenomegaly, no masses and bowel sounds normal  MS: no gross musculoskeletal defects noted, no edema  SKIN: no suspicious lesions or rashes  NEURO: Normal strength and tone, mentation intact and speech normal  PSYCH: mentation appears normal, affect normal/bright    Diagnostic Test Results:  Labs reviewed in Epic    ASSESSMENT/PLAN:   1. Encounter for preventative adult health care exam with abnormal findings    2. Gastroesophageal reflux disease, unspecified whether esophagitis present  Persistent- at goal for managing symptoms   - Hemoglobin    3. Special screening for malignant neoplasms, colon  - GASTROENTEROLOGY ADULT REF PROCEDURE ONLY; Future    4. Encounter for screening mammogram for breast cancer      Patient has been advised of split billing requirements and indicates understanding:   COUNSELING:   Reviewed preventive health counseling, as reflected in patient instructions       Regular exercise       Healthy diet/nutrition       Vision screening       Osteoporosis prevention/bone health    Estimated body mass index is 28.72 kg/m  as calculated from the following:    Height as of 4/15/19: 1.665 m (5' 5.55\").    Weight as of this encounter: 79.6 kg (175 lb 8 oz).    Weight management " plan: Discussed healthy diet and exercise guidelines    She reports that she quit smoking about 30 years ago. Her smoking use included cigarettes. She has a 0.60 pack-year smoking history. She has never used smokeless tobacco.    Recommend mediterranean or DASH type diets, exercise.    Sanford Shaw MD  [facility]

## 2020-12-30 ENCOUNTER — OFFICE VISIT (OUTPATIENT)
Dept: FAMILY MEDICINE | Facility: CLINIC | Age: 60
End: 2020-12-30
Payer: COMMERCIAL

## 2020-12-30 VITALS
SYSTOLIC BLOOD PRESSURE: 128 MMHG | OXYGEN SATURATION: 98 % | TEMPERATURE: 98.5 F | WEIGHT: 175.5 LBS | DIASTOLIC BLOOD PRESSURE: 68 MMHG | RESPIRATION RATE: 14 BRPM | BODY MASS INDEX: 28.72 KG/M2 | HEART RATE: 69 BPM

## 2020-12-30 DIAGNOSIS — K21.9 GASTROESOPHAGEAL REFLUX DISEASE, UNSPECIFIED WHETHER ESOPHAGITIS PRESENT: ICD-10-CM

## 2020-12-30 DIAGNOSIS — Z00.01 ENCOUNTER FOR PREVENTATIVE ADULT HEALTH CARE EXAM WITH ABNORMAL FINDINGS: Primary | ICD-10-CM

## 2020-12-30 DIAGNOSIS — Z12.11 SPECIAL SCREENING FOR MALIGNANT NEOPLASMS, COLON: ICD-10-CM

## 2020-12-30 DIAGNOSIS — Z12.31 ENCOUNTER FOR SCREENING MAMMOGRAM FOR BREAST CANCER: ICD-10-CM

## 2020-12-30 LAB
ALBUMIN SERPL-MCNC: 3.7 G/DL (ref 3.4–5)
ALP SERPL-CCNC: 139 U/L (ref 40–150)
ALT SERPL W P-5'-P-CCNC: 64 U/L (ref 0–50)
ANION GAP SERPL CALCULATED.3IONS-SCNC: 3 MMOL/L (ref 3–14)
AST SERPL W P-5'-P-CCNC: 41 U/L (ref 0–45)
BILIRUB SERPL-MCNC: 0.3 MG/DL (ref 0.2–1.3)
BUN SERPL-MCNC: 18 MG/DL (ref 7–30)
CALCIUM SERPL-MCNC: 9.8 MG/DL (ref 8.5–10.1)
CHLORIDE SERPL-SCNC: 106 MMOL/L (ref 94–109)
CHOLEST SERPL-MCNC: 238 MG/DL
CO2 SERPL-SCNC: 29 MMOL/L (ref 20–32)
CREAT SERPL-MCNC: 0.75 MG/DL (ref 0.52–1.04)
GFR SERPL CREATININE-BSD FRML MDRD: 87 ML/MIN/{1.73_M2}
GLUCOSE SERPL-MCNC: 102 MG/DL (ref 70–99)
HDLC SERPL-MCNC: 54 MG/DL
HGB BLD-MCNC: 13.7 G/DL (ref 11.7–15.7)
LDLC SERPL CALC-MCNC: 154 MG/DL
NONHDLC SERPL-MCNC: 184 MG/DL
POTASSIUM SERPL-SCNC: 4.3 MMOL/L (ref 3.4–5.3)
PROT SERPL-MCNC: 8.5 G/DL (ref 6.8–8.8)
SODIUM SERPL-SCNC: 138 MMOL/L (ref 133–144)
TRIGL SERPL-MCNC: 151 MG/DL

## 2020-12-30 PROCEDURE — 85018 HEMOGLOBIN: CPT | Performed by: FAMILY MEDICINE

## 2020-12-30 PROCEDURE — 80061 LIPID PANEL: CPT | Performed by: FAMILY MEDICINE

## 2020-12-30 PROCEDURE — 80053 COMPREHEN METABOLIC PANEL: CPT | Performed by: FAMILY MEDICINE

## 2020-12-30 PROCEDURE — 36415 COLL VENOUS BLD VENIPUNCTURE: CPT | Performed by: FAMILY MEDICINE

## 2020-12-30 PROCEDURE — 99396 PREV VISIT EST AGE 40-64: CPT | Mod: 25 | Performed by: FAMILY MEDICINE

## 2020-12-30 PROCEDURE — 90750 HZV VACC RECOMBINANT IM: CPT | Performed by: FAMILY MEDICINE

## 2020-12-30 PROCEDURE — 90471 IMMUNIZATION ADMIN: CPT | Performed by: FAMILY MEDICINE

## 2020-12-30 RX ORDER — MULTIVITAMIN WITH IRON
1 TABLET ORAL DAILY
COMMUNITY

## 2021-01-05 NOTE — RESULT ENCOUNTER NOTE
Jorge Luis Luz: Your recent results show an increase in LDL although no longer at goal less than 130, your overall at low risk, less than 10%, see below.  Recommend Mediterranean or Dash type diet, regular exercise and repeat in 1 year.  Contact if questions, nice to see you.  Continue to stay healthy!    Sanford   The 10-year ASCVD risk score (Gabriela BRAR Jr., et al., 2013) is: 3.2%    Values used to calculate the score:      Age: 60 years      Sex: Female      Is Non- : No      Diabetic: No      Tobacco smoker: No      Systolic Blood Pressure: 128 mmHg      Is BP treated: No      HDL Cholesterol: 54 mg/dL      Total Cholesterol: 238 mg/dL

## 2021-01-20 ENCOUNTER — TRANSFERRED RECORDS (OUTPATIENT)
Dept: HEALTH INFORMATION MANAGEMENT | Facility: CLINIC | Age: 61
End: 2021-01-20

## 2021-04-02 DIAGNOSIS — F41.1 GAD (GENERALIZED ANXIETY DISORDER): ICD-10-CM

## 2021-04-02 NOTE — TELEPHONE ENCOUNTER
"Requested Prescriptions   Signed Prescriptions Disp Refills    sertraline (ZOLOFT) 50 MG tablet 30 tablet 8     Sig: TAKE 1 TABLET BY MOUTH EVERY DAY       SSRIs Protocol Passed - 4/2/2021 12:23 AM        Passed - Recent (12 mo) or future (30 days) visit within the authorizing provider's specialty     Patient has had an office visit with the authorizing provider or a provider within the authorizing providers department within the previous 12 mos or has a future within next 30 days. See \"Patient Info\" tab in inbasket, or \"Choose Columns\" in Meds & Orders section of the refill encounter.              Passed - Medication is active on med list        Passed - Patient is age 18 or older        Passed - No active pregnancy on record        Passed - No positive pregnancy test in last 12 months           Qian Asencio RN  [facility]    "

## 2021-05-25 ENCOUNTER — RECORDS - HEALTHEAST (OUTPATIENT)
Dept: ADMINISTRATIVE | Facility: CLINIC | Age: 61
End: 2021-05-25

## 2021-05-27 ENCOUNTER — RECORDS - HEALTHEAST (OUTPATIENT)
Dept: ADMINISTRATIVE | Facility: CLINIC | Age: 61
End: 2021-05-27

## 2021-06-03 ENCOUNTER — RECORDS - HEALTHEAST (OUTPATIENT)
Dept: ADMINISTRATIVE | Facility: CLINIC | Age: 61
End: 2021-06-03

## 2021-07-13 ENCOUNTER — RECORDS - HEALTHEAST (OUTPATIENT)
Dept: ADMINISTRATIVE | Facility: CLINIC | Age: 61
End: 2021-07-13

## 2021-07-21 ENCOUNTER — RECORDS - HEALTHEAST (OUTPATIENT)
Dept: ADMINISTRATIVE | Facility: CLINIC | Age: 61
End: 2021-07-21

## 2022-01-01 DIAGNOSIS — F41.1 GAD (GENERALIZED ANXIETY DISORDER): ICD-10-CM

## 2022-02-12 ENCOUNTER — HEALTH MAINTENANCE LETTER (OUTPATIENT)
Age: 62
End: 2022-02-12

## 2022-06-02 DIAGNOSIS — F41.1 GAD (GENERALIZED ANXIETY DISORDER): ICD-10-CM

## 2022-11-04 ENCOUNTER — LAB (OUTPATIENT)
Dept: LAB | Facility: CLINIC | Age: 62
End: 2022-11-04
Payer: COMMERCIAL

## 2022-11-04 ENCOUNTER — OFFICE VISIT (OUTPATIENT)
Dept: FAMILY MEDICINE | Facility: CLINIC | Age: 62
End: 2022-11-04
Payer: COMMERCIAL

## 2022-11-04 VITALS
HEART RATE: 79 BPM | SYSTOLIC BLOOD PRESSURE: 109 MMHG | HEIGHT: 65 IN | OXYGEN SATURATION: 97 % | RESPIRATION RATE: 18 BRPM | TEMPERATURE: 97.5 F | BODY MASS INDEX: 30.41 KG/M2 | DIASTOLIC BLOOD PRESSURE: 72 MMHG | WEIGHT: 182.5 LBS

## 2022-11-04 DIAGNOSIS — Z11.4 SCREENING FOR HIV (HUMAN IMMUNODEFICIENCY VIRUS): Primary | ICD-10-CM

## 2022-11-04 DIAGNOSIS — R00.2 PALPITATIONS: ICD-10-CM

## 2022-11-04 DIAGNOSIS — K21.9 GASTROESOPHAGEAL REFLUX DISEASE, UNSPECIFIED WHETHER ESOPHAGITIS PRESENT: ICD-10-CM

## 2022-11-04 DIAGNOSIS — R53.83 TIRED: ICD-10-CM

## 2022-11-04 DIAGNOSIS — Z00.00 ENCOUNTER FOR PREVENTATIVE ADULT HEALTH CARE EXAMINATION: Primary | ICD-10-CM

## 2022-11-04 DIAGNOSIS — F41.1 GAD (GENERALIZED ANXIETY DISORDER): ICD-10-CM

## 2022-11-04 DIAGNOSIS — Z00.00 ENCOUNTER FOR PREVENTATIVE ADULT HEALTH CARE EXAMINATION: ICD-10-CM

## 2022-11-04 PROBLEM — R74.8 ELEVATED ALKALINE PHOSPHATASE MEASUREMENT: Status: RESOLVED | Noted: 2017-11-30 | Resolved: 2022-11-04

## 2022-11-04 LAB
ALBUMIN SERPL-MCNC: 3.5 G/DL (ref 3.4–5)
ALP SERPL-CCNC: 113 U/L (ref 40–150)
ALT SERPL W P-5'-P-CCNC: 50 U/L (ref 0–50)
ANION GAP SERPL CALCULATED.3IONS-SCNC: 7 MMOL/L (ref 3–14)
AST SERPL W P-5'-P-CCNC: 27 U/L (ref 0–45)
BILIRUB SERPL-MCNC: 0.4 MG/DL (ref 0.2–1.3)
BUN SERPL-MCNC: 17 MG/DL (ref 7–30)
CALCIUM SERPL-MCNC: 9.5 MG/DL (ref 8.5–10.1)
CHLORIDE BLD-SCNC: 108 MMOL/L (ref 94–109)
CHOLEST SERPL-MCNC: 210 MG/DL
CO2 SERPL-SCNC: 25 MMOL/L (ref 20–32)
CREAT SERPL-MCNC: 0.74 MG/DL (ref 0.52–1.04)
ERYTHROCYTE [DISTWIDTH] IN BLOOD BY AUTOMATED COUNT: 13.3 % (ref 10–15)
FASTING STATUS PATIENT QL REPORTED: YES
GFR SERPL CREATININE-BSD FRML MDRD: >90 ML/MIN/1.73M2
GLUCOSE BLD-MCNC: 109 MG/DL (ref 70–99)
HCT VFR BLD AUTO: 40.4 % (ref 35–47)
HDLC SERPL-MCNC: 54 MG/DL
HGB BLD-MCNC: 13.1 G/DL (ref 11.7–15.7)
HIV 1+2 AB+HIV1 P24 AG SERPL QL IA: NONREACTIVE
LDLC SERPL CALC-MCNC: 100 MG/DL
MCH RBC QN AUTO: 29.4 PG (ref 26.5–33)
MCHC RBC AUTO-ENTMCNC: 32.4 G/DL (ref 31.5–36.5)
MCV RBC AUTO: 91 FL (ref 78–100)
NONHDLC SERPL-MCNC: 156 MG/DL
PLATELET # BLD AUTO: 316 10E3/UL (ref 150–450)
POTASSIUM BLD-SCNC: 4.2 MMOL/L (ref 3.4–5.3)
PROT SERPL-MCNC: 7.7 G/DL (ref 6.8–8.8)
RBC # BLD AUTO: 4.45 10E6/UL (ref 3.8–5.2)
SODIUM SERPL-SCNC: 140 MMOL/L (ref 133–144)
TRIGL SERPL-MCNC: 278 MG/DL
TSH SERPL DL<=0.005 MIU/L-ACNC: 1.6 MU/L (ref 0.4–4)
WBC # BLD AUTO: 7.6 10E3/UL (ref 4–11)

## 2022-11-04 PROCEDURE — 87389 HIV-1 AG W/HIV-1&-2 AB AG IA: CPT

## 2022-11-04 PROCEDURE — 80053 COMPREHEN METABOLIC PANEL: CPT

## 2022-11-04 PROCEDURE — 36415 COLL VENOUS BLD VENIPUNCTURE: CPT

## 2022-11-04 PROCEDURE — 85027 COMPLETE CBC AUTOMATED: CPT

## 2022-11-04 PROCEDURE — 80061 LIPID PANEL: CPT

## 2022-11-04 PROCEDURE — 99396 PREV VISIT EST AGE 40-64: CPT | Performed by: FAMILY MEDICINE

## 2022-11-04 PROCEDURE — 84443 ASSAY THYROID STIM HORMONE: CPT

## 2022-11-04 ASSESSMENT — ENCOUNTER SYMPTOMS
DIARRHEA: 0
DIZZINESS: 0
ABDOMINAL PAIN: 0
SORE THROAT: 0
NERVOUS/ANXIOUS: 0
ARTHRALGIAS: 0
BREAST MASS: 0
NAUSEA: 0
CONSTIPATION: 0
FEVER: 0
HEMATOCHEZIA: 0
MYALGIAS: 0
DYSURIA: 0
HEADACHES: 0
EYE PAIN: 0
COUGH: 0
WEAKNESS: 0
HEARTBURN: 0
SHORTNESS OF BREATH: 0
FREQUENCY: 0
CHILLS: 0
PARESTHESIAS: 0
HEMATURIA: 0
JOINT SWELLING: 0
PALPITATIONS: 0

## 2022-11-04 ASSESSMENT — PAIN SCALES - GENERAL: PAINLEVEL: NO PAIN (0)

## 2022-11-04 NOTE — PROGRESS NOTES
SUBJECTIVE:   CC: Natty is an 62 year old who presents for preventive health visit.     Patient has been advised of split billing requirements and indicates understanding: Yes  HPI   Annual Preventive Visit:  Frequency of exercise:: 1 day/week  Getting at least 3 servings of Calcium per day:: Yes  Diet:: regular (no restrictions)  Taking medications regularly:: No  Medication side effects:: None  Bi-annual eye exam:: No  Dental care twice a year:: Yes  Sleep apnea or symptoms of sleep apnea:: Daytime drowsiness  Associated symptoms:   Negative: abdominal pain, Blood in stool, Blood in urine, chest pain, chills, congestion, constipation, cough, diarrhea, dizziness, ear pain, eye pain, nervous/anxious, fever, frequency, genital sores, headaches, hearing loss, heartburn, arthralgias, joint swelling, peripheral edema, mood changes, myalgias, nausea, dysuria, palpitations, Skin sensation changes, sore throat, urgency, rash, shortness of breath, visual disturbance, weakness  pelvic pain: No  vaginal bleeding: No  vaginal discharge: No  tenderness: No  breast mass: No  breast discharge: No  Additional concerns today:: Yes (please make note of and bring with you to your appointment)  Duration of exercise:: Less than 15 minutes    Needs update on surgical history     Tired-snores, would like sleep study    GERD-Pepcid x2 days, cimetidine third day, repeats.  Takes Tums for breakthrough.  Recently waking up at night with reflux, better with sleeping on several pillows    Palpitations-same over many years.  Has known PVCs.  No dizziness lightheadedness or chest pain.    Anxiety Follow-Up    How are you doing with your anxiety since your last visit? No change    Are you having other symptoms that might be associated with anxiety? No    Have you had a significant life event? No     Are you feeling depressed? No    Do you have any concerns with your use of alcohol or other drugs? No    Social History     Tobacco Use      Smoking status: Former     Packs/day: 0.20     Years: 3.00     Pack years: 0.60     Types: Cigarettes     Quit date: 1990     Years since quittin.5     Smokeless tobacco: Never   Vaping Use     Vaping Use: Never used   Substance Use Topics     Alcohol use: Yes     Comment: rare     Drug use: No     LATHA-7 SCORE 2017 4/15/2019   Total Score 2 0     PHQ 2017 2018 4/15/2019   PHQ-9 Total Score 4 2 2   Q9: Thoughts of better off dead/self-harm past 2 weeks Not at all Not at all Not at all           Today's PHQ-2 Score:   PHQ-2 (  [employer]) 2022   Q1: Little interest or pleasure in doing things 0   Q2: Feeling down, depressed or hopeless 0   PHQ-2 Score 0   PHQ-2 Total Score (12-17 Years)- Positive if 3 or more points; Administer PHQ-A if positive -   Q1: Little interest or pleasure in doing things Not at all   Q2: Feeling down, depressed or hopeless Not at all   PHQ-2 Score 0       Abuse: Current or Past (Physical, Sexual or Emotional) - No  Do you feel safe in your environment? Yes        Social History     Tobacco Use     Smoking status: Former     Packs/day: 0.20     Years: 3.00     Pack years: 0.60     Types: Cigarettes     Quit date: 1990     Years since quittin.5     Smokeless tobacco: Never   Substance Use Topics     Alcohol use: Yes     Comment: rare     If you drink alcohol do you typically have >3 drinks per day or >7 drinks per week? No    Alcohol Use 2022   Prescreen: >3 drinks/day or >7 drinks/week? No   Prescreen: >3 drinks/day or >7 drinks/week? -   No flowsheet data found.    Reviewed orders with patient.  Reviewed health maintenance and updated orders accordingly - Yes  Lab work is in process  Labs reviewed in EPIC    Breast Cancer Screening:    FHS-7:   Breast CA Risk Assessment (FHS-7) 2022   Did any of your first-degree relatives have breast or ovarian cancer? No   Did any of your relatives have bilateral breast cancer? No   Did any man in your  "family have breast cancer? No   Did any woman in your family have breast and ovarian cancer? No   Did any woman in your family have breast cancer before age 50 y? Yes   Do you have 2 or more relatives with breast and/or ovarian cancer? No   Do you have 2 or more relatives with breast and/or bowel cancer? No       Mammogram Screening: Recommended mammography every 1-2 years with patient discussion and risk factor consideration  Pertinent mammograms are reviewed under the imaging tab.    History of abnormal Pap smear: NO - age 30-65 PAP every 5 years with negative HPV co-testing recommended     Reviewed and updated as needed this visit by clinical staff   Tobacco  Allergies    Med Hx  Surg Hx  Fam Hx  Soc Hx        Reviewed and updated as needed this visit by Provider                 Review of Systems   Constitutional: Negative for chills and fever.   HENT: Negative for congestion, ear pain, hearing loss and sore throat.    Eyes: Negative for pain and visual disturbance.   Respiratory: Negative for cough and shortness of breath.    Cardiovascular: Negative for chest pain, palpitations and peripheral edema.   Gastrointestinal: Negative for abdominal pain, constipation, diarrhea, heartburn, hematochezia and nausea.   Breasts:  Negative for tenderness, breast mass and discharge.   Genitourinary: Negative for dysuria, frequency, genital sores, hematuria, pelvic pain, urgency, vaginal bleeding and vaginal discharge.   Musculoskeletal: Negative for arthralgias, joint swelling and myalgias.   Skin: Negative for rash.   Neurological: Negative for dizziness, weakness, headaches and paresthesias.   Psychiatric/Behavioral: Negative for mood changes. The patient is not nervous/anxious.       OBJECTIVE:   /72 (BP Location: Left arm, Patient Position: Sitting, Cuff Size: Adult Large)   Pulse 79   Temp 97.5  F (36.4  C) (Temporal)   Resp 18   Ht 1.652 m (5' 5.04\")   Wt 82.8 kg (182 lb 8 oz)   LMP 04/08/2011   SpO2 " "97%   BMI 30.33 kg/m    Physical Exam  GENERAL APPEARANCE: healthy, alert and no distress  EYES: Eyes grossly normal to inspection, PERRL and conjunctivae and sclerae normal  HENT: ear canals and TM's normal, nose and mouth without ulcers or lesions, oropharynx clear and oral mucous membranes moist  NECK: no adenopathy, no asymmetry, masses, or scars and thyroid normal to palpation  RESP: lungs clear to auscultation - no rales, rhonchi or wheezes  CV: regular rate and rhythm, normal S1 S2, no S3 or S4, no murmur, click or rub, no peripheral edema and peripheral pulses strong  ABDOMEN: soft, nontender, no hepatosplenomegaly, no masses and bowel sounds normal  MS: no musculoskeletal defects are noted and gait is age appropriate without ataxia  SKIN: no suspicious lesions or rashes  NEURO: Normal strength and tone, sensory exam grossly normal, mentation intact and speech normal  PSYCH: mentation appears normal and affect normal/bright    Diagnostic Test Results:  Labs reviewed in Epic    ASSESSMENT/PLAN:       ICD-10-CM    1. Encounter for preventative adult health care examination  Z00.00 Lipid panel reflex to direct LDL Fasting      2. Tired  R53.83 CBC with platelets     Comprehensive metabolic panel (BMP + Alb, Alk Phos, ALT, AST, Total. Bili, TP)     TSH with free T4 reflex      3. Gastroesophageal reflux disease, unspecified whether esophagitis present  K21.9       4. Palpitations  R00.2       5. LATHA (generalized anxiety disorder)  F41.1           COUNSELING:  Reviewed preventive health counseling, as reflected in patient instructions       Regular exercise       Healthy diet/nutrition       Vision screening       Osteoporosis prevention/bone health       Colorectal Cancer Screening    Estimated body mass index is 30.33 kg/m  as calculated from the following:    Height as of this encounter: 1.652 m (5' 5.04\").    Weight as of this encounter: 82.8 kg (182 lb 8 oz).    Weight management plan: Discussed healthy " diet and exercise guidelines    She reports that she quit smoking about 32 years ago. Her smoking use included cigarettes. She has a 0.60 pack-year smoking history. She has never used smokeless tobacco.    Patient Instructions   Colonoscopy rescheduled for 2/23    Contact for Holter if palpitations worse    Sanford Shaw MD  [facility]

## 2022-11-05 NOTE — RESULT ENCOUNTER NOTE
Jorge Luis Luz: Your recent results are within normal limits; minor abnormalities are not worrisome.  Contact if questions, nice to see you!    aSnford FROST

## 2023-02-03 ENCOUNTER — TRANSFERRED RECORDS (OUTPATIENT)
Dept: HEALTH INFORMATION MANAGEMENT | Facility: CLINIC | Age: 63
End: 2023-02-03
Payer: COMMERCIAL

## 2023-10-05 ENCOUNTER — PATIENT OUTREACH (OUTPATIENT)
Dept: CARE COORDINATION | Facility: CLINIC | Age: 63
End: 2023-10-05
Payer: COMMERCIAL

## 2023-10-19 ENCOUNTER — PATIENT OUTREACH (OUTPATIENT)
Dept: CARE COORDINATION | Facility: CLINIC | Age: 63
End: 2023-10-19
Payer: COMMERCIAL

## 2023-12-13 DIAGNOSIS — F41.1 GAD (GENERALIZED ANXIETY DISORDER): ICD-10-CM

## 2023-12-14 RX ORDER — SERTRALINE HYDROCHLORIDE 25 MG/1
TABLET, FILM COATED ORAL
Qty: 30 TABLET | Refills: 11 | Status: SHIPPED | OUTPATIENT
Start: 2023-12-14

## 2024-01-06 ENCOUNTER — HEALTH MAINTENANCE LETTER (OUTPATIENT)
Age: 64
End: 2024-01-06

## 2024-12-21 ENCOUNTER — HEALTH MAINTENANCE LETTER (OUTPATIENT)
Age: 64
End: 2024-12-21

## 2025-04-12 ENCOUNTER — HEALTH MAINTENANCE LETTER (OUTPATIENT)
Age: 65
End: 2025-04-12